# Patient Record
Sex: MALE | Race: BLACK OR AFRICAN AMERICAN | NOT HISPANIC OR LATINO | Employment: OTHER | RURAL
[De-identification: names, ages, dates, MRNs, and addresses within clinical notes are randomized per-mention and may not be internally consistent; named-entity substitution may affect disease eponyms.]

---

## 2020-11-10 ENCOUNTER — HISTORICAL (OUTPATIENT)
Dept: ADMINISTRATIVE | Facility: HOSPITAL | Age: 60
End: 2020-11-10

## 2021-05-07 ENCOUNTER — OFFICE VISIT (OUTPATIENT)
Dept: FAMILY MEDICINE | Facility: CLINIC | Age: 61
End: 2021-05-07
Payer: COMMERCIAL

## 2021-05-07 VITALS
DIASTOLIC BLOOD PRESSURE: 68 MMHG | HEART RATE: 71 BPM | TEMPERATURE: 99 F | SYSTOLIC BLOOD PRESSURE: 130 MMHG | HEIGHT: 74 IN | BODY MASS INDEX: 20.66 KG/M2 | OXYGEN SATURATION: 97 % | WEIGHT: 161 LBS

## 2021-05-07 DIAGNOSIS — L72.3 SEBACEOUS CYST: Primary | ICD-10-CM

## 2021-05-07 PROCEDURE — 99213 PR OFFICE/OUTPT VISIT, EST, LEVL III, 20-29 MIN: ICD-10-PCS | Mod: ,,, | Performed by: FAMILY MEDICINE

## 2021-05-07 PROCEDURE — 99213 OFFICE O/P EST LOW 20 MIN: CPT | Mod: ,,, | Performed by: FAMILY MEDICINE

## 2021-05-07 RX ORDER — FLUTICASONE PROPIONATE 50 MCG
SPRAY, SUSPENSION (ML) NASAL
COMMUNITY
End: 2021-05-13 | Stop reason: SDUPTHER

## 2021-05-07 RX ORDER — HYDROCHLOROTHIAZIDE 25 MG/1
1 TABLET ORAL DAILY
COMMUNITY
End: 2021-05-13 | Stop reason: SDUPTHER

## 2021-05-07 RX ORDER — DULOXETIN HYDROCHLORIDE 30 MG/1
30 CAPSULE, DELAYED RELEASE ORAL DAILY
COMMUNITY
End: 2021-05-13 | Stop reason: SDUPTHER

## 2021-05-07 RX ORDER — OLOPATADINE HYDROCHLORIDE 1 MG/ML
SOLUTION/ DROPS OPHTHALMIC
COMMUNITY
End: 2021-05-13 | Stop reason: SDUPTHER

## 2021-05-07 RX ORDER — ESCITALOPRAM OXALATE 10 MG/1
10 TABLET ORAL DAILY
COMMUNITY
End: 2021-05-13 | Stop reason: SDUPTHER

## 2021-05-07 RX ORDER — IPRATROPIUM BROMIDE AND ALBUTEROL SULFATE 2.5; .5 MG/3ML; MG/3ML
SOLUTION RESPIRATORY (INHALATION)
COMMUNITY
End: 2021-05-13 | Stop reason: SDUPTHER

## 2021-05-07 RX ORDER — MELOXICAM 7.5 MG/1
7.5 TABLET ORAL DAILY
COMMUNITY
End: 2021-05-13 | Stop reason: SDUPTHER

## 2021-05-07 RX ORDER — OMEPRAZOLE 20 MG/1
1 CAPSULE, DELAYED RELEASE ORAL DAILY
COMMUNITY
End: 2021-05-13 | Stop reason: SDUPTHER

## 2021-05-07 RX ORDER — ATORVASTATIN CALCIUM 10 MG/1
10 TABLET, FILM COATED ORAL DAILY
COMMUNITY
End: 2021-05-13 | Stop reason: SDUPTHER

## 2021-05-07 RX ORDER — BUSPIRONE HYDROCHLORIDE 10 MG/1
10 TABLET ORAL DAILY
COMMUNITY
Start: 2021-04-22 | End: 2021-05-13 | Stop reason: SDUPTHER

## 2021-05-07 RX ORDER — ALBUTEROL SULFATE 90 UG/1
AEROSOL, METERED RESPIRATORY (INHALATION)
COMMUNITY
End: 2021-05-13 | Stop reason: SDUPTHER

## 2021-05-07 RX ORDER — IBUPROFEN 800 MG/1
TABLET ORAL
COMMUNITY
End: 2021-05-13 | Stop reason: SDUPTHER

## 2021-05-13 RX ORDER — MELOXICAM 7.5 MG/1
7.5 TABLET ORAL DAILY
Qty: 30 TABLET | Refills: 0 | Status: SHIPPED | OUTPATIENT
Start: 2021-05-13 | End: 2021-08-05 | Stop reason: SDUPTHER

## 2021-05-13 RX ORDER — OMEPRAZOLE 20 MG/1
20 CAPSULE, DELAYED RELEASE ORAL DAILY
Qty: 90 CAPSULE | Refills: 0 | Status: SHIPPED | OUTPATIENT
Start: 2021-05-13 | End: 2021-09-10 | Stop reason: SDUPTHER

## 2021-05-13 RX ORDER — BUSPIRONE HYDROCHLORIDE 10 MG/1
10 TABLET ORAL DAILY
Qty: 90 TABLET | Refills: 0 | Status: SHIPPED | OUTPATIENT
Start: 2021-05-13 | End: 2021-12-07

## 2021-05-13 RX ORDER — ESCITALOPRAM OXALATE 10 MG/1
10 TABLET ORAL DAILY
Qty: 90 TABLET | Refills: 0 | Status: SHIPPED | OUTPATIENT
Start: 2021-05-13 | End: 2021-12-07

## 2021-05-13 RX ORDER — OLOPATADINE HYDROCHLORIDE 1 MG/ML
SOLUTION/ DROPS OPHTHALMIC
Qty: 5 ML | Refills: 2 | Status: SHIPPED | OUTPATIENT
Start: 2021-05-13 | End: 2021-12-07

## 2021-05-13 RX ORDER — FLUTICASONE PROPIONATE 50 MCG
SPRAY, SUSPENSION (ML) NASAL
Qty: 16 G | Refills: 2 | Status: SHIPPED | OUTPATIENT
Start: 2021-05-13 | End: 2022-09-16 | Stop reason: SDUPTHER

## 2021-05-13 RX ORDER — IBUPROFEN 800 MG/1
TABLET ORAL
Qty: 30 TABLET | Refills: 0 | Status: SHIPPED | OUTPATIENT
Start: 2021-05-13 | End: 2021-12-07

## 2021-05-13 RX ORDER — DULOXETIN HYDROCHLORIDE 30 MG/1
30 CAPSULE, DELAYED RELEASE ORAL DAILY
Qty: 90 CAPSULE | Refills: 0 | Status: SHIPPED | OUTPATIENT
Start: 2021-05-13 | End: 2021-12-07

## 2021-05-13 RX ORDER — IPRATROPIUM BROMIDE AND ALBUTEROL SULFATE 2.5; .5 MG/3ML; MG/3ML
SOLUTION RESPIRATORY (INHALATION)
Qty: 1 BOX | Refills: 1 | Status: SHIPPED | OUTPATIENT
Start: 2021-05-13 | End: 2021-09-10 | Stop reason: SDUPTHER

## 2021-05-13 RX ORDER — ALBUTEROL SULFATE 90 UG/1
AEROSOL, METERED RESPIRATORY (INHALATION)
Qty: 18 G | Refills: 1 | Status: SHIPPED | OUTPATIENT
Start: 2021-05-13 | End: 2021-09-10 | Stop reason: SDUPTHER

## 2021-05-13 RX ORDER — HYDROCHLOROTHIAZIDE 25 MG/1
25 TABLET ORAL DAILY
Qty: 90 TABLET | Refills: 0 | Status: SHIPPED | OUTPATIENT
Start: 2021-05-13 | End: 2021-09-10 | Stop reason: SDUPTHER

## 2021-05-13 RX ORDER — ATORVASTATIN CALCIUM 10 MG/1
10 TABLET, FILM COATED ORAL DAILY
Qty: 90 TABLET | Refills: 0 | Status: SHIPPED | OUTPATIENT
Start: 2021-05-13 | End: 2021-10-11 | Stop reason: SDUPTHER

## 2021-05-19 ENCOUNTER — OFFICE VISIT (OUTPATIENT)
Dept: SURGERY | Facility: CLINIC | Age: 61
End: 2021-05-19
Payer: COMMERCIAL

## 2021-05-19 DIAGNOSIS — L72.3 SEBACEOUS CYST: ICD-10-CM

## 2021-05-19 PROCEDURE — 99202 PR OFFICE/OUTPT VISIT, NEW, LEVL II, 15-29 MIN: ICD-10-PCS | Mod: S$PBB,,, | Performed by: SURGERY

## 2021-05-19 PROCEDURE — 99202 OFFICE O/P NEW SF 15 MIN: CPT | Mod: S$PBB,,, | Performed by: SURGERY

## 2021-05-19 PROCEDURE — 99214 OFFICE O/P EST MOD 30 MIN: CPT | Mod: PBBFAC | Performed by: SURGERY

## 2021-05-21 PROBLEM — L72.3 SEBACEOUS CYST: Status: ACTIVE | Noted: 2021-05-21

## 2021-05-27 RX ORDER — GABAPENTIN 300 MG/1
300 CAPSULE ORAL EVERY 8 HOURS
Qty: 270 CAPSULE | Refills: 0 | Status: SHIPPED | OUTPATIENT
Start: 2021-05-27 | End: 2021-12-07 | Stop reason: SDUPTHER

## 2021-05-27 RX ORDER — GABAPENTIN 300 MG/1
300 CAPSULE ORAL EVERY 8 HOURS
COMMUNITY
Start: 2021-01-12 | End: 2021-05-27 | Stop reason: SDUPTHER

## 2021-06-15 ENCOUNTER — OFFICE VISIT (OUTPATIENT)
Dept: SURGERY | Facility: CLINIC | Age: 61
End: 2021-06-15
Attending: SURGERY
Payer: COMMERCIAL

## 2021-06-15 DIAGNOSIS — L72.3 SEBACEOUS CYST: Primary | ICD-10-CM

## 2021-06-15 PROCEDURE — 10061 EXCISION OF CYST: ICD-10-PCS | Mod: S$PBB,,, | Performed by: SURGERY

## 2021-06-15 PROCEDURE — 10060 I&D ABSCESS SIMPLE/SINGLE: CPT | Mod: PBBFAC | Performed by: SURGERY

## 2021-06-15 PROCEDURE — 99213 OFFICE O/P EST LOW 20 MIN: CPT | Mod: PBBFAC,25 | Performed by: SURGERY

## 2021-06-15 PROCEDURE — 10061 I&D ABSCESS COMP/MULTIPLE: CPT | Mod: S$PBB,,, | Performed by: SURGERY

## 2021-06-24 ENCOUNTER — TELEPHONE (OUTPATIENT)
Dept: FAMILY MEDICINE | Facility: CLINIC | Age: 61
End: 2021-06-24

## 2021-06-25 ENCOUNTER — OFFICE VISIT (OUTPATIENT)
Dept: SURGERY | Facility: CLINIC | Age: 61
End: 2021-06-25
Attending: SURGERY
Payer: COMMERCIAL

## 2021-06-25 DIAGNOSIS — L72.3 SEBACEOUS CYST: Primary | ICD-10-CM

## 2021-06-25 PROCEDURE — 99024 PR POST-OP FOLLOW-UP VISIT: ICD-10-PCS | Mod: ,,, | Performed by: SURGERY

## 2021-06-25 PROCEDURE — 99024 POSTOP FOLLOW-UP VISIT: CPT | Mod: ,,, | Performed by: SURGERY

## 2021-06-25 PROCEDURE — 99213 OFFICE O/P EST LOW 20 MIN: CPT | Mod: PBBFAC | Performed by: SURGERY

## 2021-06-28 PROBLEM — Z09 POSTOP CHECK: Status: ACTIVE | Noted: 2021-06-28

## 2021-06-29 ENCOUNTER — OFFICE VISIT (OUTPATIENT)
Dept: FAMILY MEDICINE | Facility: CLINIC | Age: 61
End: 2021-06-29
Payer: COMMERCIAL

## 2021-06-29 VITALS
BODY MASS INDEX: 21.74 KG/M2 | HEIGHT: 73 IN | TEMPERATURE: 99 F | HEART RATE: 84 BPM | OXYGEN SATURATION: 96 % | SYSTOLIC BLOOD PRESSURE: 124 MMHG | WEIGHT: 164 LBS | DIASTOLIC BLOOD PRESSURE: 70 MMHG

## 2021-06-29 DIAGNOSIS — I10 ESSENTIAL HYPERTENSION: ICD-10-CM

## 2021-06-29 DIAGNOSIS — Z86.73 HISTORY OF CVA (CEREBROVASCULAR ACCIDENT) WITHOUT RESIDUAL DEFICITS: Primary | ICD-10-CM

## 2021-06-29 DIAGNOSIS — M54.16 LUMBAR RADICULOPATHY: ICD-10-CM

## 2021-06-29 DIAGNOSIS — J44.9 CHRONIC OBSTRUCTIVE PULMONARY DISEASE, UNSPECIFIED COPD TYPE: ICD-10-CM

## 2021-06-29 PROCEDURE — 99214 OFFICE O/P EST MOD 30 MIN: CPT | Mod: ,,, | Performed by: FAMILY MEDICINE

## 2021-06-29 PROCEDURE — 99214 PR OFFICE/OUTPT VISIT, EST, LEVL IV, 30-39 MIN: ICD-10-PCS | Mod: ,,, | Performed by: FAMILY MEDICINE

## 2021-06-29 RX ORDER — FLUOXETINE HCL 20 MG
20 CAPSULE ORAL DAILY
COMMUNITY
Start: 2021-04-29 | End: 2021-12-07

## 2021-06-29 RX ORDER — HYDROXYZINE PAMOATE 25 MG/1
25 CAPSULE ORAL 2 TIMES DAILY PRN
COMMUNITY
Start: 2021-04-29 | End: 2022-09-16 | Stop reason: SDUPTHER

## 2021-06-29 RX ORDER — QUETIAPINE FUMARATE 50 MG/1
50 TABLET, FILM COATED ORAL NIGHTLY
COMMUNITY
Start: 2021-04-29 | End: 2022-12-02 | Stop reason: SDUPTHER

## 2021-07-20 DIAGNOSIS — I63.9 CVA (CEREBRAL VASCULAR ACCIDENT): Primary | ICD-10-CM

## 2021-07-26 ENCOUNTER — CLINICAL SUPPORT (OUTPATIENT)
Dept: REHABILITATION | Facility: HOSPITAL | Age: 61
End: 2021-07-26
Payer: COMMERCIAL

## 2021-07-26 DIAGNOSIS — I63.9 CVA (CEREBRAL VASCULAR ACCIDENT): ICD-10-CM

## 2021-07-26 DIAGNOSIS — Z86.73 HISTORY OF CVA (CEREBROVASCULAR ACCIDENT) WITHOUT RESIDUAL DEFICITS: Primary | ICD-10-CM

## 2021-07-26 PROCEDURE — 97542 WHEELCHAIR MNGMENT TRAINING: CPT

## 2021-08-05 RX ORDER — MELOXICAM 7.5 MG/1
7.5 TABLET ORAL DAILY
Qty: 30 TABLET | Refills: 0 | Status: SHIPPED | OUTPATIENT
Start: 2021-08-05 | End: 2021-10-11 | Stop reason: SDUPTHER

## 2021-09-10 ENCOUNTER — OFFICE VISIT (OUTPATIENT)
Dept: FAMILY MEDICINE | Facility: CLINIC | Age: 61
End: 2021-09-10
Payer: COMMERCIAL

## 2021-09-10 VITALS
BODY MASS INDEX: 21.47 KG/M2 | SYSTOLIC BLOOD PRESSURE: 148 MMHG | TEMPERATURE: 98 F | DIASTOLIC BLOOD PRESSURE: 74 MMHG | HEIGHT: 73 IN | OXYGEN SATURATION: 98 % | WEIGHT: 162 LBS | HEART RATE: 67 BPM

## 2021-09-10 DIAGNOSIS — M54.16 LUMBAR RADICULOPATHY: Primary | ICD-10-CM

## 2021-09-10 DIAGNOSIS — I10 ESSENTIAL HYPERTENSION: ICD-10-CM

## 2021-09-10 DIAGNOSIS — J44.9 CHRONIC OBSTRUCTIVE PULMONARY DISEASE, UNSPECIFIED COPD TYPE: ICD-10-CM

## 2021-09-10 DIAGNOSIS — I69.30 HISTORY OF CVA WITH RESIDUAL DEFICIT: ICD-10-CM

## 2021-09-10 LAB
ANION GAP SERPL CALCULATED.3IONS-SCNC: 8 MMOL/L (ref 7–16)
BASOPHILS # BLD AUTO: 0.04 K/UL (ref 0–0.2)
BASOPHILS NFR BLD AUTO: 0.6 % (ref 0–1)
BUN SERPL-MCNC: 14 MG/DL (ref 7–18)
BUN/CREAT SERPL: 14 (ref 6–20)
CALCIUM SERPL-MCNC: 9 MG/DL (ref 8.5–10.1)
CHLORIDE SERPL-SCNC: 106 MMOL/L (ref 98–107)
CHOLEST SERPL-MCNC: 118 MG/DL (ref 0–200)
CHOLEST/HDLC SERPL: 1.7 {RATIO}
CO2 SERPL-SCNC: 30 MMOL/L (ref 21–32)
CREAT SERPL-MCNC: 1 MG/DL (ref 0.7–1.3)
DIFFERENTIAL METHOD BLD: ABNORMAL
EOSINOPHIL # BLD AUTO: 0.22 K/UL (ref 0–0.5)
EOSINOPHIL NFR BLD AUTO: 3.3 % (ref 1–4)
ERYTHROCYTE [DISTWIDTH] IN BLOOD BY AUTOMATED COUNT: 13.3 % (ref 11.5–14.5)
GLUCOSE SERPL-MCNC: 74 MG/DL (ref 74–106)
HCT VFR BLD AUTO: 37.5 % (ref 40–54)
HDLC SERPL-MCNC: 70 MG/DL (ref 40–60)
HGB BLD-MCNC: 12.7 G/DL (ref 13.5–18)
IMM GRANULOCYTES # BLD AUTO: 0.02 K/UL (ref 0–0.04)
IMM GRANULOCYTES NFR BLD: 0.3 % (ref 0–0.4)
LDLC SERPL CALC-MCNC: 37 MG/DL
LDLC/HDLC SERPL: 0.5 {RATIO}
LYMPHOCYTES # BLD AUTO: 2.51 K/UL (ref 1–4.8)
LYMPHOCYTES NFR BLD AUTO: 37.9 % (ref 27–41)
MCH RBC QN AUTO: 30.7 PG (ref 27–31)
MCHC RBC AUTO-ENTMCNC: 33.9 G/DL (ref 32–36)
MCV RBC AUTO: 90.6 FL (ref 80–96)
MONOCYTES # BLD AUTO: 0.49 K/UL (ref 0–0.8)
MONOCYTES NFR BLD AUTO: 7.4 % (ref 2–6)
MPC BLD CALC-MCNC: 11 FL (ref 9.4–12.4)
NEUTROPHILS # BLD AUTO: 3.35 K/UL (ref 1.8–7.7)
NEUTROPHILS NFR BLD AUTO: 50.5 % (ref 53–65)
NONHDLC SERPL-MCNC: 48 MG/DL
NRBC # BLD AUTO: 0 X10E3/UL
NRBC, AUTO (.00): 0 %
PLATELET # BLD AUTO: 205 K/UL (ref 150–400)
POTASSIUM SERPL-SCNC: 4.4 MMOL/L (ref 3.5–5.1)
RBC # BLD AUTO: 4.14 M/UL (ref 4.6–6.2)
SODIUM SERPL-SCNC: 140 MMOL/L (ref 136–145)
TRIGL SERPL-MCNC: 55 MG/DL (ref 35–150)
VLDLC SERPL-MCNC: 11 MG/DL
WBC # BLD AUTO: 6.63 K/UL (ref 4.5–11)

## 2021-09-10 PROCEDURE — 99214 PR OFFICE/OUTPT VISIT, EST, LEVL IV, 30-39 MIN: ICD-10-PCS | Mod: ,,, | Performed by: FAMILY MEDICINE

## 2021-09-10 PROCEDURE — 85025 COMPLETE CBC W/AUTO DIFF WBC: CPT | Mod: QW,,, | Performed by: CLINICAL MEDICAL LABORATORY

## 2021-09-10 PROCEDURE — 80061 LIPID PANEL: CPT | Mod: QW,,, | Performed by: CLINICAL MEDICAL LABORATORY

## 2021-09-10 PROCEDURE — 99214 OFFICE O/P EST MOD 30 MIN: CPT | Mod: ,,, | Performed by: FAMILY MEDICINE

## 2021-09-10 PROCEDURE — 80048 BASIC METABOLIC PNL TOTAL CA: CPT | Mod: QW,,, | Performed by: CLINICAL MEDICAL LABORATORY

## 2021-09-10 PROCEDURE — 85025 CBC WITH DIFFERENTIAL: ICD-10-PCS | Mod: QW,,, | Performed by: CLINICAL MEDICAL LABORATORY

## 2021-09-10 PROCEDURE — 80061 LIPID PANEL: ICD-10-PCS | Mod: QW,,, | Performed by: CLINICAL MEDICAL LABORATORY

## 2021-09-10 PROCEDURE — 80048 BASIC METABOLIC PANEL: ICD-10-PCS | Mod: QW,,, | Performed by: CLINICAL MEDICAL LABORATORY

## 2021-09-10 RX ORDER — OMEPRAZOLE 20 MG/1
20 CAPSULE, DELAYED RELEASE ORAL DAILY
Qty: 90 CAPSULE | Refills: 0 | Status: SHIPPED | OUTPATIENT
Start: 2021-09-10 | End: 2021-12-02 | Stop reason: SDUPTHER

## 2021-09-10 RX ORDER — TRAMADOL HYDROCHLORIDE 50 MG/1
50 TABLET ORAL EVERY 6 HOURS PRN
Qty: 20 TABLET | Refills: 0 | Status: SHIPPED | OUTPATIENT
Start: 2021-09-10 | End: 2021-12-07

## 2021-09-10 RX ORDER — IPRATROPIUM BROMIDE AND ALBUTEROL SULFATE 2.5; .5 MG/3ML; MG/3ML
SOLUTION RESPIRATORY (INHALATION)
Qty: 1 BOX | Refills: 1 | Status: SHIPPED | OUTPATIENT
Start: 2021-09-10 | End: 2021-12-07

## 2021-09-10 RX ORDER — ALBUTEROL SULFATE 90 UG/1
AEROSOL, METERED RESPIRATORY (INHALATION)
Qty: 18 G | Refills: 1 | Status: SHIPPED | OUTPATIENT
Start: 2021-09-10 | End: 2021-12-07

## 2021-09-10 RX ORDER — HYDROCHLOROTHIAZIDE 25 MG/1
25 TABLET ORAL DAILY
Qty: 90 TABLET | Refills: 0 | Status: SHIPPED | OUTPATIENT
Start: 2021-09-10 | End: 2021-12-07 | Stop reason: SDUPTHER

## 2021-09-13 ENCOUNTER — TELEPHONE (OUTPATIENT)
Dept: FAMILY MEDICINE | Facility: CLINIC | Age: 61
End: 2021-09-13

## 2021-09-16 ENCOUNTER — TELEPHONE (OUTPATIENT)
Dept: FAMILY MEDICINE | Facility: CLINIC | Age: 61
End: 2021-09-16

## 2021-09-27 PROBLEM — Z09 POSTOP CHECK: Status: RESOLVED | Noted: 2021-06-28 | Resolved: 2021-09-27

## 2021-10-11 ENCOUNTER — OFFICE VISIT (OUTPATIENT)
Dept: FAMILY MEDICINE | Facility: CLINIC | Age: 61
End: 2021-10-11
Payer: COMMERCIAL

## 2021-10-11 VITALS
HEIGHT: 73 IN | DIASTOLIC BLOOD PRESSURE: 60 MMHG | TEMPERATURE: 97 F | OXYGEN SATURATION: 98 % | WEIGHT: 169 LBS | SYSTOLIC BLOOD PRESSURE: 130 MMHG | BODY MASS INDEX: 22.4 KG/M2 | HEART RATE: 68 BPM

## 2021-10-11 DIAGNOSIS — J98.59 MEDIASTINAL MASS: ICD-10-CM

## 2021-10-11 DIAGNOSIS — R91.8 LUNG MASS: Primary | ICD-10-CM

## 2021-10-11 PROCEDURE — 99213 OFFICE O/P EST LOW 20 MIN: CPT | Mod: ,,, | Performed by: FAMILY MEDICINE

## 2021-10-11 PROCEDURE — 99213 PR OFFICE/OUTPT VISIT, EST, LEVL III, 20-29 MIN: ICD-10-PCS | Mod: ,,, | Performed by: FAMILY MEDICINE

## 2021-10-11 RX ORDER — MELOXICAM 7.5 MG/1
7.5 TABLET ORAL DAILY
Qty: 30 TABLET | Refills: 0 | Status: SHIPPED | OUTPATIENT
Start: 2021-10-11 | End: 2021-12-02 | Stop reason: SDUPTHER

## 2021-10-11 RX ORDER — ATORVASTATIN CALCIUM 10 MG/1
10 TABLET, FILM COATED ORAL DAILY
Qty: 90 TABLET | Refills: 0 | Status: SHIPPED | OUTPATIENT
Start: 2021-10-11 | End: 2021-12-07

## 2021-10-18 ENCOUNTER — TELEPHONE (OUTPATIENT)
Dept: FAMILY MEDICINE | Facility: CLINIC | Age: 61
End: 2021-10-18
Payer: COMMERCIAL

## 2021-11-23 ENCOUNTER — TELEPHONE (OUTPATIENT)
Dept: FAMILY MEDICINE | Facility: CLINIC | Age: 61
End: 2021-11-23
Payer: COMMERCIAL

## 2021-11-29 RX ORDER — ROSUVASTATIN CALCIUM 20 MG/1
1 TABLET, COATED ORAL DAILY
COMMUNITY
Start: 2021-11-22 | End: 2021-12-07 | Stop reason: SDUPTHER

## 2021-11-29 RX ORDER — DOCUSATE SODIUM 100 MG/1
100 CAPSULE, LIQUID FILLED ORAL 2 TIMES DAILY
COMMUNITY
End: 2022-04-15 | Stop reason: SDUPTHER

## 2021-11-29 RX ORDER — ASPIRIN 81 MG/1
81 TABLET ORAL DAILY
COMMUNITY
End: 2022-09-16 | Stop reason: SDUPTHER

## 2021-11-29 RX ORDER — APIXABAN 5 MG/1
1 TABLET, FILM COATED ORAL 2 TIMES DAILY
COMMUNITY
Start: 2021-11-22 | End: 2022-03-18 | Stop reason: SDUPTHER

## 2021-12-02 RX ORDER — MELOXICAM 7.5 MG/1
7.5 TABLET ORAL DAILY
Qty: 30 TABLET | Refills: 0 | Status: SHIPPED | OUTPATIENT
Start: 2021-12-02 | End: 2021-12-07 | Stop reason: SDUPTHER

## 2021-12-02 RX ORDER — OMEPRAZOLE 20 MG/1
20 CAPSULE, DELAYED RELEASE ORAL DAILY
Qty: 90 CAPSULE | Refills: 0 | Status: SHIPPED | OUTPATIENT
Start: 2021-12-02 | End: 2021-12-07 | Stop reason: SDUPTHER

## 2021-12-07 ENCOUNTER — OFFICE VISIT (OUTPATIENT)
Dept: FAMILY MEDICINE | Facility: CLINIC | Age: 61
End: 2021-12-07
Payer: COMMERCIAL

## 2021-12-07 VITALS
HEART RATE: 88 BPM | WEIGHT: 165 LBS | OXYGEN SATURATION: 97 % | DIASTOLIC BLOOD PRESSURE: 60 MMHG | BODY MASS INDEX: 23.1 KG/M2 | TEMPERATURE: 97 F | SYSTOLIC BLOOD PRESSURE: 132 MMHG | HEIGHT: 71 IN

## 2021-12-07 DIAGNOSIS — C34.31 MALIGNANT NEOPLASM OF LOWER LOBE, RIGHT BRONCHUS OR LUNG: Primary | ICD-10-CM

## 2021-12-07 DIAGNOSIS — I26.99 THROMBUS OF PULMONARY VEIN: ICD-10-CM

## 2021-12-07 DIAGNOSIS — G89.3 CANCER ASSOCIATED PAIN: ICD-10-CM

## 2021-12-07 PROBLEM — F17.210 CIGARETTE SMOKER: Status: ACTIVE | Noted: 2021-12-07

## 2021-12-07 PROCEDURE — 99495 TCM SERVICES (MODERATE COMPLEXITY): ICD-10-PCS | Mod: ,,, | Performed by: FAMILY MEDICINE

## 2021-12-07 PROCEDURE — 99495 TRANSJ CARE MGMT MOD F2F 14D: CPT | Mod: ,,, | Performed by: FAMILY MEDICINE

## 2021-12-07 RX ORDER — MELOXICAM 7.5 MG/1
7.5 TABLET ORAL DAILY
Qty: 30 TABLET | Refills: 0 | Status: SHIPPED | OUTPATIENT
Start: 2021-12-07 | End: 2022-09-16 | Stop reason: SDUPTHER

## 2021-12-07 RX ORDER — ATORVASTATIN CALCIUM 10 MG/1
1 TABLET, FILM COATED ORAL DAILY
COMMUNITY
End: 2021-12-07

## 2021-12-07 RX ORDER — LEVOFLOXACIN 500 MG/1
1 TABLET, FILM COATED ORAL DAILY
COMMUNITY
Start: 2021-11-09 | End: 2022-09-16 | Stop reason: ALTCHOICE

## 2021-12-07 RX ORDER — OMEPRAZOLE 20 MG/1
20 CAPSULE, DELAYED RELEASE ORAL DAILY
Qty: 90 CAPSULE | Refills: 0 | Status: SHIPPED | OUTPATIENT
Start: 2021-12-07 | End: 2022-01-18 | Stop reason: SDUPTHER

## 2021-12-07 RX ORDER — HYDROCODONE BITARTRATE AND ACETAMINOPHEN 7.5; 325 MG/1; MG/1
1 TABLET ORAL EVERY 6 HOURS PRN
COMMUNITY
Start: 2021-12-01 | End: 2021-12-07 | Stop reason: SDUPTHER

## 2021-12-07 RX ORDER — HYDROCODONE BITARTRATE AND ACETAMINOPHEN 7.5; 325 MG/1; MG/1
1 TABLET ORAL EVERY 6 HOURS PRN
Qty: 20 TABLET | Refills: 0 | Status: SHIPPED | OUTPATIENT
Start: 2021-12-07 | End: 2022-03-23

## 2021-12-07 RX ORDER — GABAPENTIN 300 MG/1
300 CAPSULE ORAL EVERY 8 HOURS
Qty: 270 CAPSULE | Refills: 0 | Status: SHIPPED | OUTPATIENT
Start: 2021-12-07 | End: 2022-09-16 | Stop reason: SDUPTHER

## 2021-12-07 RX ORDER — HYDROCHLOROTHIAZIDE 25 MG/1
25 TABLET ORAL DAILY
Qty: 90 TABLET | Refills: 0 | Status: SHIPPED | OUTPATIENT
Start: 2021-12-07 | End: 2022-01-18 | Stop reason: SDUPTHER

## 2021-12-07 RX ORDER — BENZONATATE 100 MG/1
CAPSULE ORAL
Qty: 30 CAPSULE | Refills: 0 | Status: SHIPPED | OUTPATIENT
Start: 2021-12-07 | End: 2022-03-18 | Stop reason: SDUPTHER

## 2021-12-07 RX ORDER — ROSUVASTATIN CALCIUM 20 MG/1
20 TABLET, COATED ORAL DAILY
Qty: 90 TABLET | Refills: 0 | Status: SHIPPED | OUTPATIENT
Start: 2021-12-07 | End: 2022-01-18 | Stop reason: SDUPTHER

## 2022-01-05 ENCOUNTER — PATIENT OUTREACH (OUTPATIENT)
Dept: FAMILY MEDICINE | Facility: CLINIC | Age: 62
End: 2022-01-05
Payer: COMMERCIAL

## 2022-01-18 ENCOUNTER — OFFICE VISIT (OUTPATIENT)
Dept: FAMILY MEDICINE | Facility: CLINIC | Age: 62
End: 2022-01-18
Payer: COMMERCIAL

## 2022-01-18 VITALS
SYSTOLIC BLOOD PRESSURE: 120 MMHG | TEMPERATURE: 97 F | DIASTOLIC BLOOD PRESSURE: 62 MMHG | BODY MASS INDEX: 21.87 KG/M2 | OXYGEN SATURATION: 97 % | HEART RATE: 96 BPM | HEIGHT: 73 IN | WEIGHT: 165 LBS

## 2022-01-18 DIAGNOSIS — G89.3 CANCER RELATED PAIN: ICD-10-CM

## 2022-01-18 DIAGNOSIS — C34.31 MALIGNANT NEOPLASM OF LOWER LOBE, RIGHT BRONCHUS OR LUNG: Primary | ICD-10-CM

## 2022-01-18 PROCEDURE — 1159F MED LIST DOCD IN RCRD: CPT | Mod: ,,, | Performed by: FAMILY MEDICINE

## 2022-01-18 PROCEDURE — 3074F PR MOST RECENT SYSTOLIC BLOOD PRESSURE < 130 MM HG: ICD-10-PCS | Mod: ,,, | Performed by: FAMILY MEDICINE

## 2022-01-18 PROCEDURE — 99213 OFFICE O/P EST LOW 20 MIN: CPT | Mod: ,,, | Performed by: FAMILY MEDICINE

## 2022-01-18 PROCEDURE — 3078F PR MOST RECENT DIASTOLIC BLOOD PRESSURE < 80 MM HG: ICD-10-PCS | Mod: ,,, | Performed by: FAMILY MEDICINE

## 2022-01-18 PROCEDURE — 3074F SYST BP LT 130 MM HG: CPT | Mod: ,,, | Performed by: FAMILY MEDICINE

## 2022-01-18 PROCEDURE — 3008F BODY MASS INDEX DOCD: CPT | Mod: ,,, | Performed by: FAMILY MEDICINE

## 2022-01-18 PROCEDURE — 99213 PR OFFICE/OUTPT VISIT, EST, LEVL III, 20-29 MIN: ICD-10-PCS | Mod: ,,, | Performed by: FAMILY MEDICINE

## 2022-01-18 PROCEDURE — 3008F PR BODY MASS INDEX (BMI) DOCUMENTED: ICD-10-PCS | Mod: ,,, | Performed by: FAMILY MEDICINE

## 2022-01-18 PROCEDURE — 1159F PR MEDICATION LIST DOCUMENTED IN MEDICAL RECORD: ICD-10-PCS | Mod: ,,, | Performed by: FAMILY MEDICINE

## 2022-01-18 PROCEDURE — 1160F PR REVIEW ALL MEDS BY PRESCRIBER/CLIN PHARMACIST DOCUMENTED: ICD-10-PCS | Mod: ,,, | Performed by: FAMILY MEDICINE

## 2022-01-18 PROCEDURE — 1160F RVW MEDS BY RX/DR IN RCRD: CPT | Mod: ,,, | Performed by: FAMILY MEDICINE

## 2022-01-18 PROCEDURE — 3078F DIAST BP <80 MM HG: CPT | Mod: ,,, | Performed by: FAMILY MEDICINE

## 2022-01-18 RX ORDER — ROSUVASTATIN CALCIUM 20 MG/1
20 TABLET, COATED ORAL DAILY
Qty: 90 TABLET | Refills: 0 | Status: SHIPPED | OUTPATIENT
Start: 2022-01-18 | End: 2022-03-18 | Stop reason: SDUPTHER

## 2022-01-18 RX ORDER — HYDROCODONE BITARTRATE AND ACETAMINOPHEN 5; 325 MG/1; MG/1
1 TABLET ORAL EVERY 6 HOURS PRN
Qty: 20 TABLET | Refills: 0 | Status: SHIPPED | OUTPATIENT
Start: 2022-01-18 | End: 2022-03-23

## 2022-01-18 RX ORDER — HYDROCHLOROTHIAZIDE 25 MG/1
25 TABLET ORAL DAILY
Qty: 90 TABLET | Refills: 0 | Status: SHIPPED | OUTPATIENT
Start: 2022-01-18 | End: 2022-03-18 | Stop reason: SDUPTHER

## 2022-01-18 RX ORDER — OMEPRAZOLE 20 MG/1
20 CAPSULE, DELAYED RELEASE ORAL DAILY
Qty: 90 CAPSULE | Refills: 0 | Status: SHIPPED | OUTPATIENT
Start: 2022-01-18 | End: 2022-04-15 | Stop reason: SDUPTHER

## 2022-01-20 NOTE — PATIENT INSTRUCTIONS
- Take medications as prescribed  - Notify clinic if symptoms persist or worsen  - follow-up with subspecialists as scheduled

## 2022-01-20 NOTE — PROGRESS NOTES
[facility] Medicine    Chief Complaint      Chief Complaint   Patient presents with    Follow-up     6 week follow-up       History of Present Illness      Mayur Lundberg is a 61 y.o. male with chronic conditions of lung cancer, COPD, CVA with residual motor deficits, HTN and lumbar radiculopathy who presents today for routine follow up.  Patient states he is doing well overall.  Has follow-up with kathy Onc on 02/03/2022.  Currently undergoing radiation treatments.  States he is having cough with small amount of hemoptysis. States heme/onc aware and told it was to be expected with him on Eliquis.  Still having significant pain.  States Norco has been helping keep symptoms under control.      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    Social History:  Social History     Tobacco Use    Smoking status: Current Every Day Smoker     Packs/day: 1.00     Types: Cigarettes    Smokeless tobacco: Never Used   Substance Use Topics    Alcohol use: Yes     Alcohol/week: 2.0 standard drinks     Types: 2 Cans of beer per week    Drug use: Never       I personally reviewed all past medical, surgical, and social.     Review of Systems   Constitutional: Negative for fatigue and fever.   HENT: Negative for ear pain.    Eyes: Negative for pain and visual disturbance.   Respiratory: Positive for cough. Negative for chest tightness and shortness of breath.    Cardiovascular: Negative for chest pain and leg  swelling.   Gastrointestinal: Negative for abdominal pain.   Genitourinary: Negative for difficulty urinating.   Musculoskeletal: Positive for arthralgias and gait problem. Negative for myalgias.   Skin: Negative for rash.   Neurological: Negative for dizziness and light-headedness.   Hematological: Does not bruise/bleed easily.        Medications:  Outpatient Encounter Medications as of 1/18/2022   Medication Sig Dispense Refill    aspirin (ECOTRIN) 81 MG EC tablet Take 81 mg by mouth once daily.      benzonatate (TESSALON) 100 MG capsule One to two capsules three times daily as needed for cough 30 capsule 0    docusate sodium (COLACE) 100 MG capsule Take 100 mg by mouth 2 (two) times daily.      ELIQUIS 5 mg Tab Take 1 tablet by mouth 2 (two) times a day.      fluticasone propionate (FLONASE) 50 mcg/actuation nasal spray fluticasone propionate 50 mcg/actuation nasal spray,suspension 16 g 2    gabapentin (NEURONTIN) 300 MG capsule Take 1 capsule (300 mg total) by mouth every 8 (eight) hours. 270 capsule 0    HYDROcodone-acetaminophen (NORCO) 7.5-325 mg per tablet Take 1 tablet by mouth every 6 (six) hours as needed. 20 tablet 0    levoFLOXacin (LEVAQUIN) 500 MG tablet Take 1 tablet by mouth once daily.      meloxicam (MOBIC) 7.5 MG tablet Take 1 tablet (7.5 mg total) by mouth once daily. 30 tablet 0    SEROQUEL 50 mg tablet Take 50 mg by mouth nightly.      tiotropium-olodateroL (STIOLTO RESPIMAT) 2.5-2.5 mcg/actuation Mist Stiolto Respimat 2.5 mcg-2.5 mcg/actuation solution for inhalation 4 g 2    VISTARIL 25 mg Cap Take 25 mg by mouth 2 (two) times daily as needed.      [DISCONTINUED] hydroCHLOROthiazide (HYDRODIURIL) 25 MG tablet Take 1 tablet (25 mg total) by mouth once daily. 90 tablet 0    [DISCONTINUED] omeprazole (PRILOSEC) 20 MG capsule Take 1 capsule (20 mg total) by mouth once daily. 90 capsule 0    [DISCONTINUED] rosuvastatin (CRESTOR) 20 MG tablet Take 1 tablet (20 mg total) by mouth  "once daily. 90 tablet 0    hydroCHLOROthiazide (HYDRODIURIL) 25 MG tablet Take 1 tablet (25 mg total) by mouth once daily. 90 tablet 0    HYDROcodone-acetaminophen (NORCO) 5-325 mg per tablet Take 1 tablet by mouth every 6 (six) hours as needed for Pain. 20 tablet 0    omeprazole (PRILOSEC) 20 MG capsule Take 1 capsule (20 mg total) by mouth once daily. 90 capsule 0    rosuvastatin (CRESTOR) 20 MG tablet Take 1 tablet (20 mg total) by mouth once daily. 90 tablet 0     No facility-administered encounter medications on file as of 1/18/2022.       Allergies:  Review of patient's allergies indicates:  No Known Allergies    Health Maintenance:    There is no immunization history on file for this patient.   Health Maintenance   Topic Date Due    Hepatitis C Screening  Never done    TETANUS VACCINE  Never done    High Dose Statin  01/18/2023    Lipid Panel  09/10/2026        Physical Exam      Vital Signs  Temp: 97.4 °F (36.3 °C)  Pulse: 96  SpO2: 97 %  BP: 120/62  BP Location: Left arm  Patient Position: Sitting  Height and Weight  Height: 6' 1" (185.4 cm)  Weight: 74.8 kg (165 lb)  BSA (Calculated - sq m): 1.96 sq meters  BMI (Calculated): 21.8  Weight in (lb) to have BMI = 25: 189.1]    Physical Exam  Constitutional:       Appearance: Normal appearance.   HENT:      Head: Normocephalic.   Eyes:      Conjunctiva/sclera: Conjunctivae normal.      Pupils: Pupils are equal, round, and reactive to light.   Cardiovascular:      Rate and Rhythm: Normal rate and regular rhythm.      Pulses: Normal pulses.   Pulmonary:      Effort: Pulmonary effort is normal.      Breath sounds: Normal breath sounds.   Abdominal:      General: Bowel sounds are normal. There is no distension.      Palpations: Abdomen is soft.   Musculoskeletal:      Right lower leg: No edema.      Left lower leg: No edema.   Skin:     General: Skin is warm and dry.   Neurological:      Mental Status: He is alert and oriented to person, place, and time. " Mental status is at baseline.   Psychiatric:         Mood and Affect: Mood normal.          Laboratory:  CBC:  Recent Labs   Lab 09/10/21  1324   WBC 6.63   RBC 4.14 L   Hemoglobin 12.7 L   Hematocrit 37.5 L   Platelet Count 205   MCV 90.6   MCH 30.7   MCHC 33.9     CMP:  Recent Labs   Lab 09/10/21  1324   Glucose 74   Calcium 9.0   Sodium 140   Potassium 4.4   CO2 30   Chloride 106   BUN 14     LIPIDS:  Recent Labs   Lab 09/10/21  1324   HDL Cholesterol 70 H   Cholesterol 118   Triglycerides 55   LDL Calculated 37   Cholesterol/HDL Ratio (Risk Factor) 1.7   Non-HDL 48     TSH:      A1C:        Assessment/Plan     Mayur Lundberg is a 61 y.o.male with:     1. Malignant neoplasm of lower lobe, right bronchus or lung  - stable  - follow-up with Heme-Onc as scheduled  - Ambulatory referral/consult to Pain Clinic; Future    2. Cancer related pain  - will refill Norco 5-325mg one tab q6h PRN #20 no refills  ---  reviewed  - Ambulatory referral/consult to Pain Clinic; Future       Total time spent face-to-face and non-face-to-face coordinating care for this encounter was: 20 min    Chronic conditions status updated as per HPI.  Other than changes above, cont current medications and maintain follow up with specialists.  Return to clinic in 2 months.    Chris Bird DO  [facility]

## 2022-02-23 ENCOUNTER — OFFICE VISIT (OUTPATIENT)
Dept: PAIN MEDICINE | Facility: CLINIC | Age: 62
End: 2022-02-23
Payer: COMMERCIAL

## 2022-02-23 VITALS
WEIGHT: 159 LBS | HEART RATE: 99 BPM | SYSTOLIC BLOOD PRESSURE: 142 MMHG | BODY MASS INDEX: 20.41 KG/M2 | HEIGHT: 74 IN | DIASTOLIC BLOOD PRESSURE: 79 MMHG

## 2022-02-23 DIAGNOSIS — M54.16 LUMBAR RADICULOPATHY: Primary | ICD-10-CM

## 2022-02-23 DIAGNOSIS — M25.562 CHRONIC PAIN OF BOTH KNEES: ICD-10-CM

## 2022-02-23 DIAGNOSIS — C34.31 MALIGNANT NEOPLASM OF LOWER LOBE, RIGHT BRONCHUS OR LUNG: ICD-10-CM

## 2022-02-23 DIAGNOSIS — M25.561 CHRONIC PAIN OF BOTH KNEES: ICD-10-CM

## 2022-02-23 DIAGNOSIS — Z79.899 ENCOUNTER FOR LONG-TERM (CURRENT) USE OF OTHER MEDICATIONS: ICD-10-CM

## 2022-02-23 DIAGNOSIS — G89.3 CANCER RELATED PAIN: ICD-10-CM

## 2022-02-23 DIAGNOSIS — G89.29 CHRONIC PAIN OF BOTH KNEES: ICD-10-CM

## 2022-02-23 PROCEDURE — 3008F BODY MASS INDEX DOCD: CPT | Mod: CPTII,,, | Performed by: PAIN MEDICINE

## 2022-02-23 PROCEDURE — 3077F SYST BP >= 140 MM HG: CPT | Mod: CPTII,,, | Performed by: PAIN MEDICINE

## 2022-02-23 PROCEDURE — 3078F PR MOST RECENT DIASTOLIC BLOOD PRESSURE < 80 MM HG: ICD-10-PCS | Mod: CPTII,,, | Performed by: PAIN MEDICINE

## 2022-02-23 PROCEDURE — 99213 PR OFFICE/OUTPT VISIT, EST, LEVL III, 20-29 MIN: ICD-10-PCS | Mod: S$PBB,,, | Performed by: PAIN MEDICINE

## 2022-02-23 PROCEDURE — 99213 OFFICE O/P EST LOW 20 MIN: CPT | Mod: S$PBB,,, | Performed by: PAIN MEDICINE

## 2022-02-23 PROCEDURE — G0481 DRUG TEST DEF 8-14 CLASSES: HCPCS | Mod: 90 | Performed by: PAIN MEDICINE

## 2022-02-23 PROCEDURE — 99215 OFFICE O/P EST HI 40 MIN: CPT | Mod: PBBFAC | Performed by: PAIN MEDICINE

## 2022-02-23 PROCEDURE — 1159F MED LIST DOCD IN RCRD: CPT | Mod: CPTII,,, | Performed by: PAIN MEDICINE

## 2022-02-23 PROCEDURE — 3078F DIAST BP <80 MM HG: CPT | Mod: CPTII,,, | Performed by: PAIN MEDICINE

## 2022-02-23 PROCEDURE — 1159F PR MEDICATION LIST DOCUMENTED IN MEDICAL RECORD: ICD-10-PCS | Mod: CPTII,,, | Performed by: PAIN MEDICINE

## 2022-02-23 PROCEDURE — 3008F PR BODY MASS INDEX (BMI) DOCUMENTED: ICD-10-PCS | Mod: CPTII,,, | Performed by: PAIN MEDICINE

## 2022-02-23 PROCEDURE — 3077F PR MOST RECENT SYSTOLIC BLOOD PRESSURE >= 140 MM HG: ICD-10-PCS | Mod: CPTII,,, | Performed by: PAIN MEDICINE

## 2022-02-23 RX ORDER — HYDROCODONE BITARTRATE AND ACETAMINOPHEN 5; 325 MG/1; MG/1
1 TABLET ORAL EVERY 12 HOURS PRN
Qty: 60 TABLET | Refills: 0 | Status: SHIPPED | OUTPATIENT
Start: 2022-02-23 | End: 2022-03-23

## 2022-02-23 NOTE — PROGRESS NOTES
Pt is here in room 12 for new pt referral from Dr Cardenas for cancer.  Pt was diagnosed with lung cancer Oct 2021 and is currently getting radiation treatments.  Pt states Dr Cardenas informed him he could not prescribe his Norco any more and he is not sure why. Pt was last seen by  RADHA Mae 1- and Dr Masterson 11-.

## 2022-02-23 NOTE — PROGRESS NOTES
She Disclaimer: This note has been generated using voice-recognition software. There may be typographical errors that have been missed during proof-reading        Patient ID: Mayur Lundberg is a 61 y.o. male.      Chief Complaint: right side of chest pain      Patient is a 61-year-old male who was last evaluated in the Pain Clinic January 11, 2021 by ITA Mae.  He has a long history of  rheumatoid arthritis, status post CVA x2, chronic pain and anterior fusion is C4-6.  He was treated conservatively initially by Dr. Robin and  Laureen Patterson, WHIT,  but was discharged due to illicit substances.  He was re-evaluated by our  service and offered adjunctive medication management.  He deferred nerve block injections and was lost to follow-up.  Unfortunately, October 15, 2021 he was diagnosed with metastatic lung cancer.  He is status post radiation and awaiting repeat treatment.  He complains of severe right intercostal pain that is exacerbated with right lateral decubitus positioning and coughing.  He continues complain of bilateral knee pain and decreased ability to walk or stand for prolonged periods of time.  He was referred to our clinic for opioid maintenance.  Patient is adamant that he no longer participates in any type illicit substance behavior.  He realizes that opioids are necessary to control his cancer pain and agrees to the opioid contract for opioid maintenance.            Pain Assessment  Pain Score:   8  Pain Location: Rib cage  Pain Orientation: Right  Pain Descriptors: Aching, Burning, Constant, Dull, Sharp, Stabbing, Tingling  Pain Frequency: Continuous  Pain Onset: Awakened from sleep  Clinical Progression: Gradually worsening  Aggravating Factors: Other (Comment) (activity)  Pain Intervention(s): Medication (See eMAR), Rest      A's of Opioid Risk Assessment  Activity:Patient can not perform ADL.   Analgesia:Patients pain is not controlled by current medication.   Adverse Effects: Patient denies  constipation or sedation.  Aberrant Behavior:  reviewed with no aberrant drug seeking/taking behavior.      Patient denies any suicidal or homicidal ideations    Physical Therapy/Home Exercise: yes      MRI Cervical Spine W WO Cont  Narrative: MRI CERVICAL SPINE W/WO CONTRAST    Indication: Extremity weakness, previous surgery     Technique: Axial and sagittal imaging of the spine is performed using  T1, T2 , STIR and T2 fat sat sequences without contrast. Post contrast  the T1-weighted sequences are performed after administration of 20 cc  Dotarem.    Comparison: 28 January 2019    Findings: Vertebral bodies are similar in height and alignment.   Anterior fusion at C4-C6 appear similar. There is been posterior  laminectomy and posterior fusion performed since previous study, appears  within normal limits.    There is cord signal hyperintensity with mild loss at the C3-4 level  especially on the left side. The signal abnormality was present in the  spinal cord on the previous study at this level.    C3-4: There is disc osteophyte complex with mild canal stenosis. There  is uncovertebral joint hypertrophy with mild bilateral foraminal  stenosis.    C5-C6: There is uncovertebral joint hypertrophy contributing to moderate  right foraminal stenosis.    C6-C7: There is disc osteophyte complex with mild central canal  stenosis. There is uncovertebral joint hypertrophy with moderate to  severe right and moderate left foraminal stenosis.    C7-T1: There is disc osteophyte complex with mild central canal  stenosis. There is uncovertebral joint and facet joint hypertrophy with  severe bilateral foraminal stenosis.    Remaining spinal cord signal appears within normal limits.      Osseous marrow signal appears within normal limits.     No abnormal enhancement is seen on the post-contrasted images when  compared to the precontrast study.  Impression: Impression: Multilevel cervical fusion and spondylitic changes  as  described above. 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    This report has been electronically signed by Marlo Chaudhary  X-Ray Tibia Fibula 2 View Left  Narrative: CR SPINE CERVICAL AP AND LATERAL    Indication: Cervicalgia     Comparison: 15 Patsy 2014    Findings: No fracture is seen. Vertebral body heights and alignment are  stable with fusion from C3-C6 unchanged from previous.    There is loss of disc space and degenerative change moderate at C6-C7  and mild to moderate at C2-3.  Impression: Impression: Surgical changes and spondylitic changes. Stable when  compared to previous.    This report has been electronically signed by Marlo Chaudhary      Review of Systems   Constitutional: Negative.    HENT: Negative.    Eyes: Negative.    Respiratory: Negative.    Cardiovascular: Positive for chest pain (right chest wall).   Gastrointestinal: Negative.    Endocrine: Negative.    Genitourinary: Negative.    Musculoskeletal: Positive for arthralgias (knees) and gait problem.   Integumentary:  Negative.   Allergic/Immunologic: Negative.    Neurological: Positive for weakness (RLE) and numbness (RLE).   Hematological: Negative.    Psychiatric/Behavioral: Positive for sleep disturbance.             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 Renetta      ESOPHAGOGASTRODUODENOSCOPY      LEFT C3-C7 MBB Left  06/08/2011    DR MCFARLAND    LEFT L4-L5 TFESI Left     X6 [id number]  DR MCFARLAND    LUMBAR EPIDURAL INJECTION      TFESI L4-5      NECK SURGERY      RIGHT C3-C7 MBB Right 2015    X2  DR MCFARLAND    SOFT TISSUE CYST EXCISION       Social History     Socioeconomic History    Marital status:    Occupational History    Occupation: retired   Tobacco Use    Smoking status: Former Smoker     Packs/day: 1.00     Types: Cigarettes    Smokeless tobacco: Never Used   Substance and Sexual Activity    Alcohol use: Yes     Alcohol/week: 2.0 standard drinks     Types: 2 Cans of beer per week    Drug use: Never    Sexual activity: Not Currently     Family History   Problem Relation Age of Onset    Diabetes Mother     Cancer Mother     Heart disease Mother     Hypertension Mother     Diabetes Sister     Diabetes Brother     Diabetes Father     Hypertension Father     Stroke Father      Review of patient's allergies indicates:  No Known Allergies  has a current medication list which includes the following prescription(s): aspirin, benzonatate, docusate sodium, eliquis, fluticasone propionate, gabapentin, hydrochlorothiazide, hydrocodone-acetaminophen, omeprazole, rosuvastatin, seroquel, tiotropium-olodaterol, vistaril, hydrocodone-acetaminophen, hydrocodone-acetaminophen, levofloxacin, and meloxicam.      Objective:  Vitals:    02/23/22 1350   BP: (!) 142/79   Pulse: 99        Physical Exam  Vitals and nursing note reviewed.   Constitutional:       General: He is not in acute distress.     Appearance: Normal appearance. He is underweight. He is ill-appearing. He is not toxic-appearing or diaphoretic.   HENT:      Head: Normocephalic and atraumatic.      Nose: Nose normal.      Mouth/Throat:      Mouth: Mucous membranes are moist.   Eyes:      Extraocular Movements: Extraocular movements intact.      Pupils: Pupils are equal, round, and reactive to light.   Cardiovascular:      Rate and Rhythm:  Normal rate and regular rhythm.      Heart sounds: Normal heart sounds.   Pulmonary:      Effort: Pulmonary effort is normal. No respiratory distress.      Breath sounds: Normal breath sounds. No stridor. No wheezing or rhonchi.   Chest:      Chest wall: Tenderness (right intercostals) present.   Abdominal:      General: Bowel sounds are normal.      Palpations: Abdomen is soft.   Musculoskeletal:         General: No swelling, tenderness or deformity. Normal range of motion.      Cervical back: Normal and normal range of motion. No spasms or tenderness. No pain with movement. Normal range of motion.      Thoracic back: Normal.      Lumbar back: No spasms, tenderness or bony tenderness. Normal range of motion. Negative right straight leg raise test and negative left straight leg raise test. No scoliosis.      Right lower leg: No edema.      Left lower leg: No edema.   Skin:     General: Skin is warm.   Neurological:      General: No focal deficit present.      Mental Status: He is alert and oriented to person, place, and time. Mental status is at baseline.      Cranial Nerves: Cranial nerves are intact. No cranial nerve deficit.      Sensory: Sensation is intact. No sensory deficit.      Motor: No weakness.      Coordination: Coordination normal.      Gait: Gait abnormal.      Deep Tendon Reflexes: Reflexes are normal and symmetric.      Comments: Requires a wheelchair for mobility   Psychiatric:         Mood and Affect: Mood normal.         Behavior: Behavior normal.           Assessment:      1. Lumbar radiculopathy    2. Malignant neoplasm of lower lobe, right bronchus or lung    3. Cancer related pain    4. Chronic pain of both knees          Plan:  1. reviewed  2.Addiction, Dependency, Tolerance, Opioid abuse-misuse, Death, Diversion Discussed. Overdose reversal drug Naloxone discussed.  3.Refill/Continue medications for pain control and function       Requested Prescriptions     Signed Prescriptions Disp  Refills    HYDROcodone-acetaminophen (NORCO) 5-325 mg per tablet 60 tablet 0     Sig: Take 1 tablet by mouth every 12 (twelve) hours as needed for Pain.     4. Patient is not a candidate for nerve block injections  5. Follow with BRAYAN Trujillo in 1 month for re-evaluation and medication refill         report:  Reviewed and consistent with medication use as prescribed.      The total time spent for evaluation and management on 02/23/2022 including reviewing separately obtained history, performing a medically appropriate exam and evaluation, documenting clinical information in the health record, independently interpreting results and communicating them to the patient/family/caregiver, and ordering medications/tests/procedures was between 15-29 minutes.    The above plan and management options were discussed at length with patient. Patient is in agreement with the above and verbalized understanding. It will be communicated with the referring physician via electronic record, fax, or mail.

## 2022-03-04 LAB — BEAKER SEE SCANNED REPORT: NORMAL

## 2022-03-18 ENCOUNTER — OFFICE VISIT (OUTPATIENT)
Dept: FAMILY MEDICINE | Facility: CLINIC | Age: 62
End: 2022-03-18
Payer: COMMERCIAL

## 2022-03-18 VITALS
WEIGHT: 159 LBS | TEMPERATURE: 97 F | OXYGEN SATURATION: 97 % | HEIGHT: 74 IN | HEART RATE: 78 BPM | DIASTOLIC BLOOD PRESSURE: 72 MMHG | SYSTOLIC BLOOD PRESSURE: 120 MMHG | BODY MASS INDEX: 20.41 KG/M2

## 2022-03-18 DIAGNOSIS — M54.16 LUMBAR RADICULOPATHY: ICD-10-CM

## 2022-03-18 DIAGNOSIS — J44.9 CHRONIC OBSTRUCTIVE PULMONARY DISEASE, UNSPECIFIED COPD TYPE: Primary | ICD-10-CM

## 2022-03-18 DIAGNOSIS — C34.31 MALIGNANT NEOPLASM OF LOWER LOBE, RIGHT BRONCHUS OR LUNG: ICD-10-CM

## 2022-03-18 PROCEDURE — 1160F RVW MEDS BY RX/DR IN RCRD: CPT | Mod: ,,, | Performed by: FAMILY MEDICINE

## 2022-03-18 PROCEDURE — 96372 PR INJECTION,THERAP/PROPH/DIAG2ST, IM OR SUBCUT: ICD-10-PCS | Mod: ,,, | Performed by: FAMILY MEDICINE

## 2022-03-18 PROCEDURE — 3074F PR MOST RECENT SYSTOLIC BLOOD PRESSURE < 130 MM HG: ICD-10-PCS | Mod: ,,, | Performed by: FAMILY MEDICINE

## 2022-03-18 PROCEDURE — 1159F PR MEDICATION LIST DOCUMENTED IN MEDICAL RECORD: ICD-10-PCS | Mod: ,,, | Performed by: FAMILY MEDICINE

## 2022-03-18 PROCEDURE — 3008F BODY MASS INDEX DOCD: CPT | Mod: ,,, | Performed by: FAMILY MEDICINE

## 2022-03-18 PROCEDURE — 96372 THER/PROPH/DIAG INJ SC/IM: CPT | Mod: ,,, | Performed by: FAMILY MEDICINE

## 2022-03-18 PROCEDURE — 1159F MED LIST DOCD IN RCRD: CPT | Mod: ,,, | Performed by: FAMILY MEDICINE

## 2022-03-18 PROCEDURE — 99214 OFFICE O/P EST MOD 30 MIN: CPT | Mod: 25,,, | Performed by: FAMILY MEDICINE

## 2022-03-18 PROCEDURE — 3078F DIAST BP <80 MM HG: CPT | Mod: ,,, | Performed by: FAMILY MEDICINE

## 2022-03-18 PROCEDURE — 1160F PR REVIEW ALL MEDS BY PRESCRIBER/CLIN PHARMACIST DOCUMENTED: ICD-10-PCS | Mod: ,,, | Performed by: FAMILY MEDICINE

## 2022-03-18 PROCEDURE — 3074F SYST BP LT 130 MM HG: CPT | Mod: ,,, | Performed by: FAMILY MEDICINE

## 2022-03-18 PROCEDURE — 3078F PR MOST RECENT DIASTOLIC BLOOD PRESSURE < 80 MM HG: ICD-10-PCS | Mod: ,,, | Performed by: FAMILY MEDICINE

## 2022-03-18 PROCEDURE — 99214 PR OFFICE/OUTPT VISIT, EST, LEVL IV, 30-39 MIN: ICD-10-PCS | Mod: 25,,, | Performed by: FAMILY MEDICINE

## 2022-03-18 PROCEDURE — 3008F PR BODY MASS INDEX (BMI) DOCUMENTED: ICD-10-PCS | Mod: ,,, | Performed by: FAMILY MEDICINE

## 2022-03-18 RX ORDER — HYDROCHLOROTHIAZIDE 25 MG/1
25 TABLET ORAL DAILY
Qty: 90 TABLET | Refills: 0 | Status: SHIPPED | OUTPATIENT
Start: 2022-03-18 | End: 2022-04-15 | Stop reason: SDUPTHER

## 2022-03-18 RX ORDER — KETOROLAC TROMETHAMINE 30 MG/ML
30 INJECTION, SOLUTION INTRAMUSCULAR; INTRAVENOUS
Status: COMPLETED | OUTPATIENT
Start: 2022-03-18 | End: 2022-03-18

## 2022-03-18 RX ORDER — ALBUTEROL SULFATE 90 UG/1
2 AEROSOL, METERED RESPIRATORY (INHALATION) EVERY 6 HOURS PRN
Qty: 18 G | Refills: 2 | Status: SHIPPED | OUTPATIENT
Start: 2022-03-18 | End: 2022-09-16 | Stop reason: SDUPTHER

## 2022-03-18 RX ORDER — ROSUVASTATIN CALCIUM 20 MG/1
20 TABLET, COATED ORAL DAILY
Qty: 90 TABLET | Refills: 0 | Status: SHIPPED | OUTPATIENT
Start: 2022-03-18 | End: 2022-04-15 | Stop reason: SDUPTHER

## 2022-03-18 RX ORDER — APIXABAN 5 MG/1
5 TABLET, FILM COATED ORAL 2 TIMES DAILY
Qty: 180 TABLET | Refills: 0 | Status: SHIPPED | OUTPATIENT
Start: 2022-03-18 | End: 2022-09-16 | Stop reason: SDUPTHER

## 2022-03-18 RX ORDER — BENZONATATE 100 MG/1
CAPSULE ORAL
Qty: 30 CAPSULE | Refills: 0 | Status: SHIPPED | OUTPATIENT
Start: 2022-03-18 | End: 2022-06-17 | Stop reason: SDUPTHER

## 2022-03-18 RX ADMIN — KETOROLAC TROMETHAMINE 30 MG: 30 INJECTION, SOLUTION INTRAMUSCULAR; INTRAVENOUS at 08:03

## 2022-03-18 NOTE — PATIENT INSTRUCTIONS
- Take medications as prescribed  - Notify clinic if symptoms persist or worsen  - Follow up with subspecialists as scheduled

## 2022-03-23 NOTE — PROGRESS NOTES
Disclaimer:  This note has been generated using voice recognition software.  There may be type of graft focal areas that have been missed during a proof reading      Subjective:      Patient ID: Mayur Lundberg is a 61 y.o. male.    Chief Complaint: Low-back Pain and Knee Pain      Pain  This is a chronic problem. The current episode started more than 1 year ago. The problem occurs daily. The problem has been waxing and waning. Associated symptoms include arthralgias and neck pain. Pertinent negatives include no change in bowel habit, chest pain, chills, coughing, diaphoresis, fever, rash, sore throat, vertigo or vomiting.     Review of Systems   Constitutional: Negative for activity change, appetite change, chills, diaphoresis, fever and unexpected weight change.   HENT: Negative for drooling, ear discharge, ear pain, facial swelling, nosebleeds, sore throat, trouble swallowing, voice change and goiter.    Eyes: Negative for photophobia, pain, discharge, redness and visual disturbance.   Respiratory: Negative for apnea, cough, choking, chest tightness, shortness of breath, wheezing and stridor.    Cardiovascular: Negative for chest pain, palpitations and leg swelling.   Gastrointestinal: Negative for abdominal distention, change in bowel habit, diarrhea, rectal pain, vomiting, fecal incontinence and change in bowel habit.   Endocrine: Negative for cold intolerance, heat intolerance, polydipsia, polyphagia and polyuria.   Genitourinary: Negative for bladder incontinence, dysuria, flank pain and frequency.   Musculoskeletal: Positive for arthralgias, back pain, gait problem and neck pain.   Integumentary:  Negative for color change, pallor and rash.   Neurological: Negative for dizziness, vertigo, seizures, syncope, facial asymmetry, speech difficulty, light-headedness, disturbances in coordination, memory loss and coordination difficulties.   Hematological: Negative for adenopathy. Does not bruise/bleed easily.  "  Psychiatric/Behavioral: Negative for agitation, behavioral problems, confusion, decreased concentration, dysphoric mood, hallucinations, self-injury and suicidal ideas. The patient is not nervous/anxious and is not hyperactive.             Objective:  Vitals:    03/24/22 0912   BP: (!) 128/59   Pulse: 69   Resp: 19   Weight: 72.1 kg (159 lb)   Height: 6' 2" (1.88 m)   PainSc:   5         Physical Exam  Vitals and nursing note reviewed. Exam conducted with a chaperone present.   Constitutional:       General: He is awake. He is not in acute distress.     Appearance: Normal appearance. He is not ill-appearing, toxic-appearing or diaphoretic.   HENT:      Head: Normocephalic and atraumatic.      Nose: Nose normal.      Mouth/Throat:      Mouth: Mucous membranes are moist.      Pharynx: Oropharynx is clear.   Eyes:      Conjunctiva/sclera: Conjunctivae normal.      Pupils: Pupils are equal, round, and reactive to light.   Cardiovascular:      Rate and Rhythm: Normal rate.   Pulmonary:      Effort: Pulmonary effort is normal. No respiratory distress.   Chest:      Chest wall: Tenderness present.   Abdominal:      Palpations: Abdomen is soft.      Tenderness: There is no guarding.   Musculoskeletal:         General: Normal range of motion.      Cervical back: Normal range of motion and neck supple. Tenderness present. No rigidity.      Thoracic back: Tenderness present.      Lumbar back: Tenderness present.   Skin:     General: Skin is warm and dry.      Coloration: Skin is not jaundiced or pale.   Neurological:      General: No focal deficit present.      Mental Status: He is alert and oriented to person, place, and time. Mental status is at baseline.      Cranial Nerves: Cranial nerves are intact. No cranial nerve deficit (II-XII).      Gait: Gait abnormal.   Psychiatric:         Mood and Affect: Mood normal.         Behavior: Behavior normal. Behavior is cooperative.         Thought Content: Thought content normal. "           MRI Cervical Spine W WO Cont  Narrative: MRI CERVICAL SPINE W/WO CONTRAST    Indication: Extremity weakness, previous surgery     Technique: Axial and sagittal imaging of the spine is performed using  T1, T2 , STIR and T2 fat sat sequences without contrast. Post contrast  the T1-weighted sequences are performed after administration of 20 cc  Dotarem.    Comparison: 28 January 2019    Findings: Vertebral bodies are similar in height and alignment.   Anterior fusion at C4-C6 appear similar. There is been posterior  laminectomy and posterior fusion performed since previous study, appears  within normal limits.    There is cord signal hyperintensity with mild loss at the C3-4 level  especially on the left side. The signal abnormality was present in the  spinal cord on the previous study at this level.    C3-4: There is disc osteophyte complex with mild canal stenosis. There  is uncovertebral joint hypertrophy with mild bilateral foraminal  stenosis.    C5-C6: There is uncovertebral joint hypertrophy contributing to moderate  right foraminal stenosis.    C6-C7: There is disc osteophyte complex with mild central canal  stenosis. There is uncovertebral joint hypertrophy with moderate to  severe right and moderate left foraminal stenosis.    C7-T1: There is disc osteophyte complex with mild central canal  stenosis. There is uncovertebral joint and facet joint hypertrophy with  severe bilateral foraminal stenosis.    Remaining spinal cord signal appears within normal limits.      Osseous marrow signal appears within normal limits.     No abnormal enhancement is seen on the post-contrasted images when  compared to the precontrast study.  Impression: Impression: Multilevel cervical fusion and spondylitic changes as  described above. 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    This report has been electronically signed by Marlo Chaudhary  X-Ray Tibia Fibula 2 View Left  Narrative: CR SPINE  CERVICAL AP AND LATERAL    Indication: Cervicalgia     Comparison: 15 Patsy 2014    Findings: No fracture is seen. Vertebral body heights and alignment are  stable with fusion from C3-C6 unchanged from previous.    There is loss of disc space and degenerative change moderate at C6-C7  and mild to moderate at C2-3.  Impression: Impression: Surgical changes and spondylitic changes. Stable when  compared to previous.    This report has been electronically signed by Marlo Chaudhary       Office Visit on 02/23/2022   Component Date Value Ref Range Status    See Scanned Report 02/23/2022 See Scanned Result   Final           Assessment:      1. Malignant neoplasm of lower lobe, right bronchus or lung    2. Cancer related pain    3. Lumbar radiculopathy    4. Chronic pain of both knees          No orders of the defined types were placed in this encounter.        A's of Opioid Risk Assessment  Activity:Patient can perform ADL.   Analgesia:Patients pain is partially controlled by current medication. Patient has tried OTC medications such as Tylenol and Ibuprofen with out relief.   Adverse Effects: Patient denies constipation or sedation.  Aberrant Behavior:  reviewed with no aberrant drug seeking/taking behavior.  Overdose reversal drug naloxone discussed    Drug screen reviewed      Requested Prescriptions     Pending Prescriptions Disp Refills    HYDROcodone-acetaminophen (NORCO) 5-325 mg per tablet 60 tablet 0     Sig: Take 1 tablet by mouth every 12 (twelve) hours as needed for Pain.         Plan:    Motorized scooter for mobility history stroke left-sided weakness    Rheumatoid arthritis    Lung cancer, follows oncology [facility]    Has been diagnosed with a new lesion right lower lung area complaining of increasing right chest wall pain    He is requesting adjustment pain medication    Will increase [place] 5 1 p.o. q.8 hours number 90 tablets    Continue activity as tolerated    Follow-up 3 months sooner if needed      [place], February 2023    Bring original prescription medication in bottles with labels to each visit

## 2022-03-24 ENCOUNTER — OFFICE VISIT (OUTPATIENT)
Dept: PAIN MEDICINE | Facility: CLINIC | Age: 62
End: 2022-03-24
Payer: COMMERCIAL

## 2022-03-24 VITALS
RESPIRATION RATE: 19 BRPM | HEART RATE: 69 BPM | SYSTOLIC BLOOD PRESSURE: 128 MMHG | HEIGHT: 74 IN | BODY MASS INDEX: 20.41 KG/M2 | WEIGHT: 159 LBS | DIASTOLIC BLOOD PRESSURE: 59 MMHG

## 2022-03-24 DIAGNOSIS — M25.561 CHRONIC PAIN OF BOTH KNEES: Chronic | ICD-10-CM

## 2022-03-24 DIAGNOSIS — G89.29 CHRONIC PAIN OF BOTH KNEES: Chronic | ICD-10-CM

## 2022-03-24 DIAGNOSIS — M25.562 CHRONIC PAIN OF BOTH KNEES: Chronic | ICD-10-CM

## 2022-03-24 DIAGNOSIS — M54.16 LUMBAR RADICULOPATHY: Chronic | ICD-10-CM

## 2022-03-24 DIAGNOSIS — C34.31 MALIGNANT NEOPLASM OF LOWER LOBE, RIGHT BRONCHUS OR LUNG: Primary | Chronic | ICD-10-CM

## 2022-03-24 DIAGNOSIS — G89.3 CANCER RELATED PAIN: Chronic | ICD-10-CM

## 2022-03-24 PROBLEM — Z86.73 HISTORY OF CVA (CEREBROVASCULAR ACCIDENT) WITHOUT RESIDUAL DEFICITS: Chronic | Status: ACTIVE | Noted: 2021-06-29

## 2022-03-24 PROCEDURE — 3078F DIAST BP <80 MM HG: CPT | Mod: CPTII,,, | Performed by: PHYSICIAN ASSISTANT

## 2022-03-24 PROCEDURE — 99214 OFFICE O/P EST MOD 30 MIN: CPT | Mod: S$PBB,,, | Performed by: PHYSICIAN ASSISTANT

## 2022-03-24 PROCEDURE — 3078F PR MOST RECENT DIASTOLIC BLOOD PRESSURE < 80 MM HG: ICD-10-PCS | Mod: CPTII,,, | Performed by: PHYSICIAN ASSISTANT

## 2022-03-24 PROCEDURE — 1159F MED LIST DOCD IN RCRD: CPT | Mod: CPTII,,, | Performed by: PHYSICIAN ASSISTANT

## 2022-03-24 PROCEDURE — 99214 OFFICE O/P EST MOD 30 MIN: CPT | Mod: PBBFAC | Performed by: PHYSICIAN ASSISTANT

## 2022-03-24 PROCEDURE — 3008F PR BODY MASS INDEX (BMI) DOCUMENTED: ICD-10-PCS | Mod: CPTII,,, | Performed by: PHYSICIAN ASSISTANT

## 2022-03-24 PROCEDURE — 3008F BODY MASS INDEX DOCD: CPT | Mod: CPTII,,, | Performed by: PHYSICIAN ASSISTANT

## 2022-03-24 PROCEDURE — 3074F SYST BP LT 130 MM HG: CPT | Mod: CPTII,,, | Performed by: PHYSICIAN ASSISTANT

## 2022-03-24 PROCEDURE — 3074F PR MOST RECENT SYSTOLIC BLOOD PRESSURE < 130 MM HG: ICD-10-PCS | Mod: CPTII,,, | Performed by: PHYSICIAN ASSISTANT

## 2022-03-24 PROCEDURE — 1159F PR MEDICATION LIST DOCUMENTED IN MEDICAL RECORD: ICD-10-PCS | Mod: CPTII,,, | Performed by: PHYSICIAN ASSISTANT

## 2022-03-24 PROCEDURE — 99214 PR OFFICE/OUTPT VISIT, EST, LEVL IV, 30-39 MIN: ICD-10-PCS | Mod: S$PBB,,, | Performed by: PHYSICIAN ASSISTANT

## 2022-03-24 RX ORDER — HYDROCODONE BITARTRATE AND ACETAMINOPHEN 5; 325 MG/1; MG/1
1 TABLET ORAL EVERY 8 HOURS
Qty: 90 TABLET | Refills: 0 | Status: SHIPPED | OUTPATIENT
Start: 2022-03-25 | End: 2022-04-24

## 2022-03-24 RX ORDER — HYDROCODONE BITARTRATE AND ACETAMINOPHEN 5; 325 MG/1; MG/1
1 TABLET ORAL EVERY 8 HOURS
Qty: 90 TABLET | Refills: 0 | Status: SHIPPED | OUTPATIENT
Start: 2022-04-23 | End: 2022-05-23

## 2022-03-24 RX ORDER — HYDROCODONE BITARTRATE AND ACETAMINOPHEN 5; 325 MG/1; MG/1
1 TABLET ORAL EVERY 8 HOURS
Qty: 90 TABLET | Refills: 0 | Status: SHIPPED | OUTPATIENT
Start: 2022-05-24 | End: 2022-06-23

## 2022-04-11 ENCOUNTER — TELEPHONE (OUTPATIENT)
Dept: FAMILY MEDICINE | Facility: CLINIC | Age: 62
End: 2022-04-11

## 2022-04-15 ENCOUNTER — OFFICE VISIT (OUTPATIENT)
Dept: FAMILY MEDICINE | Facility: CLINIC | Age: 62
End: 2022-04-15
Payer: COMMERCIAL

## 2022-04-15 VITALS
WEIGHT: 163 LBS | OXYGEN SATURATION: 99 % | HEART RATE: 85 BPM | DIASTOLIC BLOOD PRESSURE: 56 MMHG | TEMPERATURE: 98 F | SYSTOLIC BLOOD PRESSURE: 100 MMHG | BODY MASS INDEX: 20.92 KG/M2 | HEIGHT: 74 IN

## 2022-04-15 DIAGNOSIS — C34.31 MALIGNANT NEOPLASM OF LOWER LOBE, RIGHT BRONCHUS OR LUNG: ICD-10-CM

## 2022-04-15 DIAGNOSIS — B02.9 HERPES ZOSTER WITHOUT COMPLICATION: Primary | ICD-10-CM

## 2022-04-15 PROCEDURE — 1159F PR MEDICATION LIST DOCUMENTED IN MEDICAL RECORD: ICD-10-PCS | Mod: ,,, | Performed by: FAMILY MEDICINE

## 2022-04-15 PROCEDURE — 3078F PR MOST RECENT DIASTOLIC BLOOD PRESSURE < 80 MM HG: ICD-10-PCS | Mod: ,,, | Performed by: FAMILY MEDICINE

## 2022-04-15 PROCEDURE — 3008F PR BODY MASS INDEX (BMI) DOCUMENTED: ICD-10-PCS | Mod: ,,, | Performed by: FAMILY MEDICINE

## 2022-04-15 PROCEDURE — 3008F BODY MASS INDEX DOCD: CPT | Mod: ,,, | Performed by: FAMILY MEDICINE

## 2022-04-15 PROCEDURE — 1159F MED LIST DOCD IN RCRD: CPT | Mod: ,,, | Performed by: FAMILY MEDICINE

## 2022-04-15 PROCEDURE — 1160F RVW MEDS BY RX/DR IN RCRD: CPT | Mod: ,,, | Performed by: FAMILY MEDICINE

## 2022-04-15 PROCEDURE — 3074F PR MOST RECENT SYSTOLIC BLOOD PRESSURE < 130 MM HG: ICD-10-PCS | Mod: ,,, | Performed by: FAMILY MEDICINE

## 2022-04-15 PROCEDURE — 99495 TRANSJ CARE MGMT MOD F2F 14D: CPT | Mod: ,,, | Performed by: FAMILY MEDICINE

## 2022-04-15 PROCEDURE — 1160F PR REVIEW ALL MEDS BY PRESCRIBER/CLIN PHARMACIST DOCUMENTED: ICD-10-PCS | Mod: ,,, | Performed by: FAMILY MEDICINE

## 2022-04-15 PROCEDURE — 99495 TCM SERVICES (MODERATE COMPLEXITY): ICD-10-PCS | Mod: ,,, | Performed by: FAMILY MEDICINE

## 2022-04-15 PROCEDURE — 3074F SYST BP LT 130 MM HG: CPT | Mod: ,,, | Performed by: FAMILY MEDICINE

## 2022-04-15 PROCEDURE — 3078F DIAST BP <80 MM HG: CPT | Mod: ,,, | Performed by: FAMILY MEDICINE

## 2022-04-15 RX ORDER — AMITRIPTYLINE HYDROCHLORIDE 25 MG/1
25 TABLET, FILM COATED ORAL NIGHTLY PRN
Qty: 30 TABLET | Refills: 1 | Status: SHIPPED | OUTPATIENT
Start: 2022-04-15 | End: 2022-09-16 | Stop reason: SDUPTHER

## 2022-04-15 RX ORDER — OMEPRAZOLE 20 MG/1
20 CAPSULE, DELAYED RELEASE ORAL DAILY
Qty: 90 CAPSULE | Refills: 0 | Status: SHIPPED | OUTPATIENT
Start: 2022-04-15 | End: 2022-09-16 | Stop reason: SDUPTHER

## 2022-04-15 RX ORDER — ROSUVASTATIN CALCIUM 20 MG/1
20 TABLET, COATED ORAL DAILY
Qty: 90 TABLET | Refills: 0 | Status: SHIPPED | OUTPATIENT
Start: 2022-04-15 | End: 2022-09-16 | Stop reason: SDUPTHER

## 2022-04-15 RX ORDER — HYDROCHLOROTHIAZIDE 25 MG/1
25 TABLET ORAL DAILY
Qty: 90 TABLET | Refills: 0 | Status: SHIPPED | OUTPATIENT
Start: 2022-04-15 | End: 2022-06-17 | Stop reason: SDUPTHER

## 2022-04-15 RX ORDER — VALACYCLOVIR HYDROCHLORIDE 1 G/1
1000 TABLET, FILM COATED ORAL
COMMUNITY
End: 2022-09-16 | Stop reason: SDUPTHER

## 2022-04-15 RX ORDER — DOCUSATE SODIUM 100 MG/1
100 CAPSULE, LIQUID FILLED ORAL 2 TIMES DAILY
Qty: 90 CAPSULE | Refills: 0 | Status: SHIPPED | OUTPATIENT
Start: 2022-04-15 | End: 2022-09-16 | Stop reason: SDUPTHER

## 2022-04-15 NOTE — PROGRESS NOTES
"[facility] Medicine    Chief Complaint      Chief Complaint   Patient presents with    Hospital Follow Up     [facility] [place] last week(can't remember the day). Was dx with shingles, prescribed Valtrex 1000mg ever 8hrs prn       History of Present Illness      Mayur Lundberg is a 61 y.o. male with chronic conditions of lung cancer, COPD, CVA with residual motor deficits, HTN and lumbar radiculopathy who presents today for TCC visit. Discharge summary and med rec reviewed. Briefly, pt was admitted after presenting to ED with c/o burning right side flank pain. Urinalysis was essentially normal and it was felt symptoms may be related to herpes zoster neuropathic pain. Pt was treated with home dose of gabapentin and Norco and started on 10 day course of valacyclovir. Pt states he never developed rash, but feels like pain is slightly improved. States he is still having pain intermittently that alternates between "burning, feeling ice cold, and sharp." States gabapentin has helped somewhat. States other chronic conditions stable. Has follow up with heme/onc on 4/19/22.    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    Social History:  Social History     Tobacco Use    " Smoking status: Former Smoker     Packs/day: 1.00     Types: Cigarettes    Smokeless tobacco: Never Used   Substance Use Topics    Alcohol use: Yes     Alcohol/week: 2.0 standard drinks     Types: 2 Cans of beer per week    Drug use: Never       I personally reviewed all past medical, surgical, and social.     Review of Systems   Constitutional: Negative for fatigue and fever.   HENT: Negative for ear pain.    Eyes: Negative for pain and visual disturbance.   Respiratory: Negative for chest tightness and shortness of breath.    Cardiovascular: Negative for chest pain and leg swelling.   Gastrointestinal: Negative for abdominal pain.   Genitourinary: Negative for difficulty urinating.   Musculoskeletal: Positive for myalgias.   Skin: Negative for rash.   Neurological: Negative for dizziness and light-headedness.   Hematological: Does not bruise/bleed easily.        Medications:  Outpatient Encounter Medications as of 4/15/2022   Medication Sig Dispense Refill    albuterol (VENTOLIN HFA) 90 mcg/actuation inhaler Inhale 2 puffs into the lungs every 6 (six) hours as needed for Wheezing. Rescue 18 g 2    amitriptyline (ELAVIL) 25 MG tablet Take 1 tablet (25 mg total) by mouth nightly as needed for Pain. 30 tablet 1    aspirin (ECOTRIN) 81 MG EC tablet Take 81 mg by mouth once daily.      benzonatate (TESSALON) 100 MG capsule One to two capsules three times daily as needed for cough 30 capsule 0    docusate sodium (COLACE) 100 MG capsule Take 1 capsule (100 mg total) by mouth 2 (two) times daily. 90 capsule 0    ELIQUIS 5 mg Tab Take 1 tablet (5 mg total) by mouth 2 (two) times a day. 180 tablet 0    fluticasone propionate (FLONASE) 50 mcg/actuation nasal spray fluticasone propionate 50 mcg/actuation nasal spray,suspension 16 g 2    gabapentin (NEURONTIN) 300 MG capsule Take 1 capsule (300 mg total) by mouth every 8 (eight) hours. 270 capsule 0    hydroCHLOROthiazide (HYDRODIURIL) 25 MG tablet Take 1 tablet  (25 mg total) by mouth once daily. 90 tablet 0    HYDROcodone-acetaminophen (NORCO) 5-325 mg per tablet Take 1 tablet by mouth every 8 (eight) hours. 90 tablet 0    [START ON 4/23/2022] HYDROcodone-acetaminophen (NORCO) 5-325 mg per tablet Take 1 tablet by mouth every 8 (eight) hours. 90 tablet 0    [START ON 5/24/2022] HYDROcodone-acetaminophen (NORCO) 5-325 mg per tablet Take 1 tablet by mouth every 8 (eight) hours. 90 tablet 0    levoFLOXacin (LEVAQUIN) 500 MG tablet Take 1 tablet by mouth once daily.      meloxicam (MOBIC) 7.5 MG tablet Take 1 tablet (7.5 mg total) by mouth once daily. 30 tablet 0    omeprazole (PRILOSEC) 20 MG capsule Take 1 capsule (20 mg total) by mouth once daily. 90 capsule 0    rosuvastatin (CRESTOR) 20 MG tablet Take 1 tablet (20 mg total) by mouth once daily. 90 tablet 0    SEROQUEL 50 mg tablet Take 50 mg by mouth nightly.      tiotropium-olodateroL (STIOLTO RESPIMAT) 2.5-2.5 mcg/actuation Mist Stiolto Respimat 2.5 mcg-2.5 mcg/actuation solution for inhalation 4 g 2    valACYclovir (VALTREX) 1000 MG tablet Take 1,000 mg by mouth every 6 to 8 hours as needed.      VISTARIL 25 mg Cap Take 25 mg by mouth 2 (two) times daily as needed.      [DISCONTINUED] docusate sodium (COLACE) 100 MG capsule Take 100 mg by mouth 2 (two) times daily.      [DISCONTINUED] hydroCHLOROthiazide (HYDRODIURIL) 25 MG tablet Take 1 tablet (25 mg total) by mouth once daily. 90 tablet 0    [DISCONTINUED] omeprazole (PRILOSEC) 20 MG capsule Take 1 capsule (20 mg total) by mouth once daily. 90 capsule 0    [DISCONTINUED] rosuvastatin (CRESTOR) 20 MG tablet Take 1 tablet (20 mg total) by mouth once daily. 90 tablet 0     No facility-administered encounter medications on file as of 4/15/2022.       Allergies:  Review of patient's allergies indicates:  No Known Allergies    Health Maintenance:    There is no immunization history on file for this patient.   Health Maintenance   Topic Date Due    Hepatitis  "C Screening  Never done    TETANUS VACCINE  Never done    High Dose Statin  04/15/2023    Lipid Panel  09/10/2026        Physical Exam      Vital Signs  Temp: 97.9 °F (36.6 °C)  Temp src: Oral  Pulse: 85  SpO2: 99 %  BP: (!) 100/56  BP Location: Left arm  Patient Position: Sitting  Height and Weight  Height: 6' 2" (188 cm)  Weight: 73.9 kg (163 lb)  BSA (Calculated - sq m): 1.96 sq meters  BMI (Calculated): 20.9  Weight in (lb) to have BMI = 25: 194.3]    Physical Exam  Constitutional:       Appearance: Normal appearance.   HENT:      Head: Normocephalic.   Eyes:      Conjunctiva/sclera: Conjunctivae normal.      Pupils: Pupils are equal, round, and reactive to light.   Cardiovascular:      Rate and Rhythm: Normal rate and regular rhythm.      Pulses: Normal pulses.   Pulmonary:      Effort: Pulmonary effort is normal.      Breath sounds: Normal breath sounds.   Abdominal:      General: Bowel sounds are normal. There is no distension.      Palpations: Abdomen is soft.   Musculoskeletal:         General: Normal range of motion.      Right lower leg: No edema.      Left lower leg: No edema.   Skin:     General: Skin is warm and dry.      Comments: No rash noted   Neurological:      General: No focal deficit present.      Mental Status: He is alert and oriented to person, place, and time.   Psychiatric:         Mood and Affect: Mood normal.          Laboratory:  CBC:  Recent Labs   Lab 09/10/21  1324   WBC 6.63   RBC 4.14 L   Hemoglobin 12.7 L   Hematocrit 37.5 L   Platelet Count 205   MCV 90.6   MCH 30.7   MCHC 33.9     CMP:  Recent Labs   Lab 09/10/21  1324   Glucose 74   Calcium 9.0   Sodium 140   Potassium 4.4   CO2 30   Chloride 106   BUN 14     LIPIDS:  Recent Labs   Lab 09/10/21  1324   HDL Cholesterol 70 H   Cholesterol 118   Triglycerides 55   LDL Calculated 37   Cholesterol/HDL Ratio (Risk Factor) 1.7   Non-HDL 48     TSH:      A1C:        Assessment/Plan     Mayur Lundberg is a 61 y.o.male with:     1. " Herpes zoster without complication  - Trial of amitriptyline 25 mg q.h.s..  - Continue gabapentin and Norco    2. Malignant neoplasm of lower lobe, right bronchus or lung  - Stable  - Follow up with heme/onc as scheduled      Chronic conditions status updated as per HPI.  Other than changes above, cont current medications and maintain follow up with specialists.  Return to clinic as scheduled.    Chris Bird DO  [facility]

## 2022-06-14 ENCOUNTER — OFFICE VISIT (OUTPATIENT)
Dept: PAIN MEDICINE | Facility: CLINIC | Age: 62
End: 2022-06-14
Payer: COMMERCIAL

## 2022-06-14 VITALS
BODY MASS INDEX: 21.17 KG/M2 | WEIGHT: 165 LBS | DIASTOLIC BLOOD PRESSURE: 71 MMHG | HEART RATE: 83 BPM | HEIGHT: 74 IN | SYSTOLIC BLOOD PRESSURE: 114 MMHG

## 2022-06-14 DIAGNOSIS — M25.562 CHRONIC PAIN OF BOTH KNEES: Chronic | ICD-10-CM

## 2022-06-14 DIAGNOSIS — M25.561 CHRONIC PAIN OF BOTH KNEES: Chronic | ICD-10-CM

## 2022-06-14 DIAGNOSIS — Z79.899 ENCOUNTER FOR LONG-TERM (CURRENT) USE OF OTHER MEDICATIONS: ICD-10-CM

## 2022-06-14 DIAGNOSIS — G89.3 CANCER RELATED PAIN: Chronic | ICD-10-CM

## 2022-06-14 DIAGNOSIS — G89.29 CHRONIC PAIN OF BOTH KNEES: Chronic | ICD-10-CM

## 2022-06-14 DIAGNOSIS — M54.16 LUMBAR RADICULOPATHY: Chronic | ICD-10-CM

## 2022-06-14 DIAGNOSIS — C34.31 MALIGNANT NEOPLASM OF LOWER LOBE, RIGHT BRONCHUS OR LUNG: Primary | Chronic | ICD-10-CM

## 2022-06-14 LAB

## 2022-06-14 PROCEDURE — 3074F PR MOST RECENT SYSTOLIC BLOOD PRESSURE < 130 MM HG: ICD-10-PCS | Mod: CPTII,,, | Performed by: PHYSICIAN ASSISTANT

## 2022-06-14 PROCEDURE — 1159F MED LIST DOCD IN RCRD: CPT | Mod: CPTII,,, | Performed by: PHYSICIAN ASSISTANT

## 2022-06-14 PROCEDURE — 99214 PR OFFICE/OUTPT VISIT, EST, LEVL IV, 30-39 MIN: ICD-10-PCS | Mod: S$PBB,,, | Performed by: PHYSICIAN ASSISTANT

## 2022-06-14 PROCEDURE — 3074F SYST BP LT 130 MM HG: CPT | Mod: CPTII,,, | Performed by: PHYSICIAN ASSISTANT

## 2022-06-14 PROCEDURE — G0481 PR DRUG TEST DEF 8-14 CLASSES: ICD-10-PCS | Mod: ,,, | Performed by: CLINICAL MEDICAL LABORATORY

## 2022-06-14 PROCEDURE — 3078F PR MOST RECENT DIASTOLIC BLOOD PRESSURE < 80 MM HG: ICD-10-PCS | Mod: CPTII,,, | Performed by: PHYSICIAN ASSISTANT

## 2022-06-14 PROCEDURE — 1159F PR MEDICATION LIST DOCUMENTED IN MEDICAL RECORD: ICD-10-PCS | Mod: CPTII,,, | Performed by: PHYSICIAN ASSISTANT

## 2022-06-14 PROCEDURE — 3008F BODY MASS INDEX DOCD: CPT | Mod: CPTII,,, | Performed by: PHYSICIAN ASSISTANT

## 2022-06-14 PROCEDURE — 99214 OFFICE O/P EST MOD 30 MIN: CPT | Mod: PBBFAC | Performed by: PHYSICIAN ASSISTANT

## 2022-06-14 PROCEDURE — 80305 DRUG TEST PRSMV DIR OPT OBS: CPT | Mod: PBBFAC | Performed by: PHYSICIAN ASSISTANT

## 2022-06-14 PROCEDURE — 3008F PR BODY MASS INDEX (BMI) DOCUMENTED: ICD-10-PCS | Mod: CPTII,,, | Performed by: PHYSICIAN ASSISTANT

## 2022-06-14 PROCEDURE — 99214 OFFICE O/P EST MOD 30 MIN: CPT | Mod: S$PBB,,, | Performed by: PHYSICIAN ASSISTANT

## 2022-06-14 PROCEDURE — G0481 DRUG TEST DEF 8-14 CLASSES: HCPCS | Mod: ,,, | Performed by: CLINICAL MEDICAL LABORATORY

## 2022-06-14 PROCEDURE — 3078F DIAST BP <80 MM HG: CPT | Mod: CPTII,,, | Performed by: PHYSICIAN ASSISTANT

## 2022-06-14 RX ORDER — HYDROCODONE BITARTRATE AND ACETAMINOPHEN 10; 325 MG/1; MG/1
1 TABLET ORAL EVERY 8 HOURS PRN
Qty: 90 TABLET | Refills: 0 | Status: SHIPPED | OUTPATIENT
Start: 2022-06-23 | End: 2022-07-23

## 2022-06-14 RX ORDER — HYDROCODONE BITARTRATE AND ACETAMINOPHEN 5; 325 MG/1; MG/1
1 TABLET ORAL EVERY 8 HOURS
Qty: 90 TABLET | Refills: 0 | Status: CANCELLED | OUTPATIENT
Start: 2022-06-14 | End: 2022-07-14

## 2022-06-14 RX ORDER — HYDROCODONE BITARTRATE AND ACETAMINOPHEN 10; 325 MG/1; MG/1
1 TABLET ORAL EVERY 8 HOURS PRN
Qty: 90 TABLET | Refills: 0 | Status: SHIPPED | OUTPATIENT
Start: 2022-07-23 | End: 2022-08-09 | Stop reason: SDUPTHER

## 2022-06-16 LAB
6-ACETYLMORPHINE, URINE (RUSH): NEGATIVE 10 NG/ML
7-AMINOCLONAZEPAM, URINE (RUSH): NEGATIVE 25 NG/ML
A-HYDROXYALPRAZOLAM, URINE (RUSH): NEGATIVE 25 NG/ML
ACETYL FENTANYL, URINE (RUSH): NEGATIVE 2.5 NG/ML
ACETYL NORFENTANYL OXALATE, URINE (RUSH): NEGATIVE 5 NG/ML
AMPHET UR QL SCN: NEGATIVE
BENZOYLECGONINE, URINE (RUSH): NEGATIVE 100 NG/ML
BUPRENORPHINE UR QL SCN: NEGATIVE 25 NG/ML
CODEINE, URINE (RUSH): NEGATIVE 25 NG/ML
CREAT UR-MCNC: 73 MG/DL (ref 39–259)
EDDP, URINE (RUSH): NEGATIVE 25 NG/ML
FENTANYL, URINE (RUSH): NEGATIVE 2.5 NG/ML
HYDROCODONE, URINE (RUSH): >250 25 NG/ML
HYDROMORPHONE, URINE (RUSH): 90 25 NG/ML
LORAZEPAM, URINE (RUSH): NEGATIVE 25 NG/ML
METHADONE UR QL SCN: NEGATIVE 25 NG/ML
METHAMPHET UR QL SCN: NEGATIVE
MORPHINE, URINE (RUSH): NEGATIVE 25 NG/ML
NORBUPRENORPHINE, URINE (RUSH): NEGATIVE 25 NG/ML
NORDIAZEPAM, URINE (RUSH): NEGATIVE 25 NG/ML
NORFENTANYL OXALATE, URINE (RUSH): NEGATIVE 5 NG/ML
NORHYDROCODONE, URINE (RUSH): >500 50 NG/ML
NOROXYCODONE HCL, URINE (RUSH): NEGATIVE 50 NG/ML
OXAZEPAM, URINE (RUSH): NEGATIVE 25 NG/ML
OXYCODONE UR QL SCN: NEGATIVE 25 NG/ML
OXYMORPHONE, URINE (RUSH): NEGATIVE 25 NG/ML
PH UR STRIP: 6.5 PH UNITS
SP GR UR STRIP: 1.01
TAPENTADOL, URINE (RUSH): NEGATIVE 25 NG/ML
TEMAZEPAM, URINE (RUSH): NEGATIVE 25 NG/ML
THC-COOH, URINE (RUSH): NEGATIVE 25 NG/ML
TRAMADOL, URINE (RUSH): NEGATIVE 100 NG/ML

## 2022-06-17 ENCOUNTER — OFFICE VISIT (OUTPATIENT)
Dept: FAMILY MEDICINE | Facility: CLINIC | Age: 62
End: 2022-06-17
Payer: COMMERCIAL

## 2022-06-17 VITALS
SYSTOLIC BLOOD PRESSURE: 100 MMHG | HEART RATE: 82 BPM | TEMPERATURE: 99 F | OXYGEN SATURATION: 97 % | WEIGHT: 165 LBS | DIASTOLIC BLOOD PRESSURE: 50 MMHG | HEIGHT: 74 IN | BODY MASS INDEX: 21.17 KG/M2

## 2022-06-17 DIAGNOSIS — C34.31 MALIGNANT NEOPLASM OF LOWER LOBE, RIGHT BRONCHUS OR LUNG: Primary | ICD-10-CM

## 2022-06-17 DIAGNOSIS — Z86.73 HISTORY OF CVA (CEREBROVASCULAR ACCIDENT) WITHOUT RESIDUAL DEFICITS: ICD-10-CM

## 2022-06-17 DIAGNOSIS — M54.16 LUMBAR RADICULOPATHY: ICD-10-CM

## 2022-06-17 PROCEDURE — 3008F PR BODY MASS INDEX (BMI) DOCUMENTED: ICD-10-PCS | Mod: ,,, | Performed by: FAMILY MEDICINE

## 2022-06-17 PROCEDURE — 1160F PR REVIEW ALL MEDS BY PRESCRIBER/CLIN PHARMACIST DOCUMENTED: ICD-10-PCS | Mod: ,,, | Performed by: FAMILY MEDICINE

## 2022-06-17 PROCEDURE — 99214 OFFICE O/P EST MOD 30 MIN: CPT | Mod: ,,, | Performed by: FAMILY MEDICINE

## 2022-06-17 PROCEDURE — 3074F SYST BP LT 130 MM HG: CPT | Mod: ,,, | Performed by: FAMILY MEDICINE

## 2022-06-17 PROCEDURE — 1159F MED LIST DOCD IN RCRD: CPT | Mod: ,,, | Performed by: FAMILY MEDICINE

## 2022-06-17 PROCEDURE — 1159F PR MEDICATION LIST DOCUMENTED IN MEDICAL RECORD: ICD-10-PCS | Mod: ,,, | Performed by: FAMILY MEDICINE

## 2022-06-17 PROCEDURE — 1160F RVW MEDS BY RX/DR IN RCRD: CPT | Mod: ,,, | Performed by: FAMILY MEDICINE

## 2022-06-17 PROCEDURE — 3074F PR MOST RECENT SYSTOLIC BLOOD PRESSURE < 130 MM HG: ICD-10-PCS | Mod: ,,, | Performed by: FAMILY MEDICINE

## 2022-06-17 PROCEDURE — 3008F BODY MASS INDEX DOCD: CPT | Mod: ,,, | Performed by: FAMILY MEDICINE

## 2022-06-17 PROCEDURE — 3078F DIAST BP <80 MM HG: CPT | Mod: ,,, | Performed by: FAMILY MEDICINE

## 2022-06-17 PROCEDURE — 99214 PR OFFICE/OUTPT VISIT, EST, LEVL IV, 30-39 MIN: ICD-10-PCS | Mod: ,,, | Performed by: FAMILY MEDICINE

## 2022-06-17 PROCEDURE — 3078F PR MOST RECENT DIASTOLIC BLOOD PRESSURE < 80 MM HG: ICD-10-PCS | Mod: ,,, | Performed by: FAMILY MEDICINE

## 2022-06-17 RX ORDER — BENZONATATE 100 MG/1
CAPSULE ORAL
Qty: 30 CAPSULE | Refills: 2 | Status: SHIPPED | OUTPATIENT
Start: 2022-06-17 | End: 2022-08-26 | Stop reason: SDUPTHER

## 2022-06-17 RX ORDER — HYDROCHLOROTHIAZIDE 25 MG/1
25 TABLET ORAL DAILY
Qty: 90 TABLET | Refills: 0 | Status: SHIPPED | OUTPATIENT
Start: 2022-06-17 | End: 2022-09-16 | Stop reason: SDUPTHER

## 2022-06-17 NOTE — PROGRESS NOTES
[facility] Medicine    Chief Complaint      Chief Complaint   Patient presents with    Follow-up     3 Month       History of Present Illness      Mayur Lundberg is a 61 y.o. male with chronic conditions of cancer, COPD, CVA with residual motor deficits, HTN and lumbar radiculopathy who presents today for routine follow up. Pt states he is doing well overall with no acute complaints. Established with Pain Treatment since last visit and states chronic pain is under much better control. Had follow up with heme/onc in [place] yesterday but missed appt due to transportation issues. Plans to call and reschedule. States breathing has been stable.    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    Social History:  Social History     Tobacco Use    Smoking status: Former Smoker     Packs/day: 1.00     Types: Cigarettes    Smokeless tobacco: Never Used   Substance Use Topics    Alcohol use: Yes     Alcohol/week: 2.0 standard drinks     Types: 2 Cans of beer per week    Drug use: Never       I personally reviewed all past medical, surgical, and social.     Review of Systems   Constitutional: Negative for fatigue and fever.   HENT: Negative for ear pain.    Eyes:  Negative for pain and visual disturbance.   Respiratory: Negative for chest tightness and shortness of breath.    Cardiovascular: Negative for chest pain and leg swelling.   Gastrointestinal: Negative for abdominal pain.   Genitourinary: Negative for difficulty urinating.   Musculoskeletal: Positive for arthralgias and gait problem. Negative for myalgias.   Skin: Negative for rash.   Neurological: Negative for dizziness and light-headedness.   Hematological: Does not bruise/bleed easily.        Medications:  Outpatient Encounter Medications as of 6/17/2022   Medication Sig Dispense Refill    albuterol (VENTOLIN HFA) 90 mcg/actuation inhaler Inhale 2 puffs into the lungs every 6 (six) hours as needed for Wheezing. Rescue 18 g 2    amitriptyline (ELAVIL) 25 MG tablet Take 1 tablet (25 mg total) by mouth nightly as needed for Pain. 30 tablet 1    aspirin (ECOTRIN) 81 MG EC tablet Take 81 mg by mouth once daily.      docusate sodium (COLACE) 100 MG capsule Take 1 capsule (100 mg total) by mouth 2 (two) times daily. 90 capsule 0    ELIQUIS 5 mg Tab Take 1 tablet (5 mg total) by mouth 2 (two) times a day. 180 tablet 0    fluticasone propionate (FLONASE) 50 mcg/actuation nasal spray fluticasone propionate 50 mcg/actuation nasal spray,suspension 16 g 2    gabapentin (NEURONTIN) 300 MG capsule Take 1 capsule (300 mg total) by mouth every 8 (eight) hours. 270 capsule 0    [START ON 6/23/2022] HYDROcodone-acetaminophen (NORCO)  mg per tablet Take 1 tablet by mouth every 8 (eight) hours as needed for Pain. 90 tablet 0    [START ON 7/23/2022] HYDROcodone-acetaminophen (NORCO)  mg per tablet Take 1 tablet by mouth every 8 (eight) hours as needed for Pain. 90 tablet 0    HYDROcodone-acetaminophen (NORCO) 5-325 mg per tablet Take 1 tablet by mouth every 8 (eight) hours. 90 tablet 0    levoFLOXacin (LEVAQUIN) 500 MG tablet Take 1 tablet by mouth once daily.      meloxicam (MOBIC) 7.5 MG tablet Take 1 tablet  "(7.5 mg total) by mouth once daily. 30 tablet 0    omeprazole (PRILOSEC) 20 MG capsule Take 1 capsule (20 mg total) by mouth once daily. 90 capsule 0    rosuvastatin (CRESTOR) 20 MG tablet Take 1 tablet (20 mg total) by mouth once daily. 90 tablet 0    SEROQUEL 50 mg tablet Take 50 mg by mouth nightly.      tiotropium-olodateroL (STIOLTO RESPIMAT) 2.5-2.5 mcg/actuation Mist Stiolto Respimat 2.5 mcg-2.5 mcg/actuation solution for inhalation 4 g 2    valACYclovir (VALTREX) 1000 MG tablet Take 1,000 mg by mouth every 6 to 8 hours as needed.      VISTARIL 25 mg Cap Take 25 mg by mouth 2 (two) times daily as needed.      [DISCONTINUED] benzonatate (TESSALON) 100 MG capsule One to two capsules three times daily as needed for cough 30 capsule 0    [DISCONTINUED] hydroCHLOROthiazide (HYDRODIURIL) 25 MG tablet Take 1 tablet (25 mg total) by mouth once daily. 90 tablet 0     No facility-administered encounter medications on file as of 6/17/2022.       Allergies:  Review of patient's allergies indicates:  No Known Allergies    Health Maintenance:    There is no immunization history on file for this patient.   Health Maintenance   Topic Date Due    Hepatitis C Screening  Never done    TETANUS VACCINE  Never done    High Dose Statin  06/14/2023    Lipid Panel  09/10/2026        Physical Exam      Vital Signs  Temp: 98.7 °F (37.1 °C)  Temp src: Oral  Pulse: 82  SpO2: 97 %  BP: (!) 100/50  BP Location: Left arm  Patient Position: Sitting  Height and Weight  Height: 6' 2" (188 cm)  Weight: 74.8 kg (165 lb)  BSA (Calculated - sq m): 1.98 sq meters  BMI (Calculated): 21.2  Weight in (lb) to have BMI = 25: 194.3]    Physical Exam  Constitutional:       Appearance: Normal appearance.   HENT:      Head: Normocephalic.   Eyes:      Conjunctiva/sclera: Conjunctivae normal.      Pupils: Pupils are equal, round, and reactive to light.   Cardiovascular:      Rate and Rhythm: Normal rate and regular rhythm.      Pulses: Normal " pulses.   Pulmonary:      Effort: Pulmonary effort is normal.      Breath sounds: Normal breath sounds.   Abdominal:      General: Bowel sounds are normal. There is no distension.      Palpations: Abdomen is soft.   Musculoskeletal:         General: Normal range of motion.      Right lower leg: No edema.      Left lower leg: No edema.   Skin:     General: Skin is warm and dry.   Neurological:      Mental Status: He is alert and oriented to person, place, and time. Mental status is at baseline.      Motor: Weakness present.   Psychiatric:         Mood and Affect: Mood normal.          Laboratory:  CBC:  Recent Labs   Lab 09/10/21  1324   WBC 6.63   RBC 4.14 L   Hemoglobin 12.7 L   Hematocrit 37.5 L   Platelet Count 205   MCV 90.6   MCH 30.7   MCHC 33.9     CMP:  Recent Labs   Lab 09/10/21  1324   Glucose 74   Calcium 9.0   Sodium 140   Potassium 4.4   CO2 30   Chloride 106   BUN 14     LIPIDS:  Recent Labs   Lab 09/10/21  1324   HDL Cholesterol 70 H   Cholesterol 118   Triglycerides 55   LDL Calculated 37   Cholesterol/HDL Ratio (Risk Factor) 1.7   Non-HDL 48     TSH:      A1C:        Assessment/Plan     Mayur Lundberg is a 61 y.o.male with:     1. Malignant neoplasm of lower lobe, right bronchus or lung    2. Lumbar radiculopathy    3. History of CVA (cerebrovascular accident) without residual deficits       - Chronic conditions stable  - Continue current medications  - Follow up with subspecialists as scheduled      Total time spent face-to-face and non-face-to-face coordinating care for this encounter was: 30 min    Chronic conditions status updated as per HPI.  Other than changes above, cont current medications and maintain follow up with specialists.  Return to clinic in 3 months.    Chris Bird DO  [facility]

## 2022-07-25 NOTE — PROGRESS NOTES
[facility]    Chief Complaint      Chief Complaint   Patient presents with    Follow-up     2 month follow up       History of Present Illness      Mayur Lundberg is a 61 y.o. male with chronic conditions of lung cancer, COPD, CVA with residual motor deficits, HTN and lumbar radiculopathy who presents today for routine follow up. Today pt has c/o chronic low back and knee pain. Recently established with Pain Treatment and has follow upw ith them on 3/24/22. Still having cough, but states he has not had much hemoptysis. Has follow up with heme/onc with repeat CT chest on 4/4/22. Pt states he is using Stiolto daily, but does not have albuterol inhaler.    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    Social History:  Social History     Tobacco Use    Smoking status: Former Smoker     Packs/day: 1.00     Types: Cigarettes    Smokeless tobacco: Never Used   Substance Use Topics    Alcohol use: Yes     Alcohol/week: 2.0 standard drinks     Types: 2 Cans of beer per week    Drug use: Never       I personally reviewed all past medical, surgical, and social.     Review of Systems   Constitutional: Negative for fatigue and fever.   HENT: Negative for ear  pain.    Eyes: Negative for pain and visual disturbance.   Respiratory: Positive for cough. Negative for chest tightness and shortness of breath.    Cardiovascular: Negative for chest pain and leg swelling.   Gastrointestinal: Negative for abdominal pain.   Genitourinary: Negative for difficulty urinating.   Musculoskeletal: Positive for arthralgias and back pain. Negative for gait problem and myalgias.   Skin: Negative for rash.   Neurological: Negative for dizziness and light-headedness.   Hematological: Does not bruise/bleed easily.        Medications:  Outpatient Encounter Medications as of 3/18/2022   Medication Sig Dispense Refill    aspirin (ECOTRIN) 81 MG EC tablet Take 81 mg by mouth once daily.      docusate sodium (COLACE) 100 MG capsule Take 100 mg by mouth 2 (two) times daily.      fluticasone propionate (FLONASE) 50 mcg/actuation nasal spray fluticasone propionate 50 mcg/actuation nasal spray,suspension 16 g 2    gabapentin (NEURONTIN) 300 MG capsule Take 1 capsule (300 mg total) by mouth every 8 (eight) hours. 270 capsule 0    HYDROcodone-acetaminophen (NORCO) 5-325 mg per tablet Take 1 tablet by mouth every 12 (twelve) hours as needed for Pain. 60 tablet 0    HYDROcodone-acetaminophen (NORCO) 7.5-325 mg per tablet Take 1 tablet by mouth every 6 (six) hours as needed. 20 tablet 0    levoFLOXacin (LEVAQUIN) 500 MG tablet Take 1 tablet by mouth once daily.      omeprazole (PRILOSEC) 20 MG capsule Take 1 capsule (20 mg total) by mouth once daily. 90 capsule 0    SEROQUEL 50 mg tablet Take 50 mg by mouth nightly.      tiotropium-olodateroL (STIOLTO RESPIMAT) 2.5-2.5 mcg/actuation Mist Stiolto Respimat 2.5 mcg-2.5 mcg/actuation solution for inhalation 4 g 2    VISTARIL 25 mg Cap Take 25 mg by mouth 2 (two) times daily as needed.      [DISCONTINUED] benzonatate (TESSALON) 100 MG capsule One to two capsules three times daily as needed for cough 30 capsule 0    [DISCONTINUED] ELIQUIS 5 mg Tab  "Take 1 tablet by mouth 2 (two) times a day.      [DISCONTINUED] hydroCHLOROthiazide (HYDRODIURIL) 25 MG tablet Take 1 tablet (25 mg total) by mouth once daily. 90 tablet 0    [DISCONTINUED] rosuvastatin (CRESTOR) 20 MG tablet Take 1 tablet (20 mg total) by mouth once daily. 90 tablet 0    albuterol (VENTOLIN HFA) 90 mcg/actuation inhaler Inhale 2 puffs into the lungs every 6 (six) hours as needed for Wheezing. Rescue 18 g 2    benzonatate (TESSALON) 100 MG capsule One to two capsules three times daily as needed for cough 30 capsule 0    ELIQUIS 5 mg Tab Take 1 tablet (5 mg total) by mouth 2 (two) times a day. 180 tablet 0    hydroCHLOROthiazide (HYDRODIURIL) 25 MG tablet Take 1 tablet (25 mg total) by mouth once daily. 90 tablet 0    HYDROcodone-acetaminophen (NORCO) 5-325 mg per tablet Take 1 tablet by mouth every 6 (six) hours as needed for Pain. (Patient not taking: Reported on 2/23/2022) 20 tablet 0    meloxicam (MOBIC) 7.5 MG tablet Take 1 tablet (7.5 mg total) by mouth once daily. (Patient not taking: Reported on 2/23/2022) 30 tablet 0    rosuvastatin (CRESTOR) 20 MG tablet Take 1 tablet (20 mg total) by mouth once daily. 90 tablet 0     Facility-Administered Encounter Medications as of 3/18/2022   Medication Dose Route Frequency Provider Last Rate Last Admin    ketorolac injection 30 mg  30 mg Intramuscular 1 time in Clinic/HOD Chris Bird DO           Allergies:  Review of patient's allergies indicates:  No Known Allergies    Health Maintenance:    There is no immunization history on file for this patient.   Health Maintenance   Topic Date Due    Hepatitis C Screening  Never done    TETANUS VACCINE  Never done    High Dose Statin  02/23/2023    Lipid Panel  09/10/2026        Physical Exam      Vital Signs  Temp: 97.3 °F (36.3 °C)  Pulse: 78  SpO2: 97 %  BP: 120/72  BP Location: Left arm  Patient Position: Sitting  Height and Weight  Height: 6' 2" (188 cm)  Weight: 72.1 kg (159 lb)  BSA " (Calculated - sq m): 1.94 sq meters  BMI (Calculated): 20.4  Weight in (lb) to have BMI = 25: 194.3]    Physical Exam  Constitutional:       Appearance: Normal appearance.   HENT:      Head: Normocephalic.   Eyes:      Conjunctiva/sclera: Conjunctivae normal.      Pupils: Pupils are equal, round, and reactive to light.   Cardiovascular:      Rate and Rhythm: Normal rate and regular rhythm.      Pulses: Normal pulses.   Pulmonary:      Effort: Pulmonary effort is normal.      Breath sounds: Normal breath sounds.   Abdominal:      General: Bowel sounds are normal. There is no distension.      Palpations: Abdomen is soft.   Musculoskeletal:         General: Normal range of motion.      Right lower leg: No edema.      Left lower leg: No edema.   Skin:     General: Skin is warm and dry.   Neurological:      Mental Status: He is alert and oriented to person, place, and time. Mental status is at baseline.   Psychiatric:         Mood and Affect: Mood normal.          Laboratory:  CBC:  Recent Labs   Lab 09/10/21  1324   WBC 6.63   RBC 4.14 L   Hemoglobin 12.7 L   Hematocrit 37.5 L   Platelet Count 205   MCV 90.6   MCH 30.7   MCHC 33.9     CMP:  Recent Labs   Lab 09/10/21  1324   Glucose 74   Calcium 9.0   Sodium 140   Potassium 4.4   CO2 30   Chloride 106   BUN 14     LIPIDS:  Recent Labs   Lab 09/10/21  1324   HDL Cholesterol 70 H   Cholesterol 118   Triglycerides 55   LDL Calculated 37   Cholesterol/HDL Ratio (Risk Factor) 1.7   Non-HDL 48     TSH:      A1C:        Assessment/Plan     Mayur Lundberg is a 61 y.o.male with:     1. Chronic obstructive pulmonary disease, unspecified COPD type  - Continue Stiolto  - Will restart PRN albuterol    2. Malignant neoplasm of lower lobe, right bronchus or lung  - Stable  - Follow up with heme/onc as scheduled    3. Lumbar radiculopathy  - ketorolac injection 30 mg  - Follow up with Pain Treatment as scheduled    Total time spent face-to-face and non-face-to-face coordinating care  for this encounter was: 30 min    Chronic conditions status updated as per HPI.  Other than changes above, cont current medications and maintain follow up with specialists.  Return to clinic in 3 months.    Chris Bird DO  [facility]     [FreeTextEntry1] : In regards to patients Physical exam, routine blood work drawn, will review results with patient.\par \par HLD- patient will continue Atorvastatin will test Lipid panel today \par \par Counseling included abnormal lab results, differential diagnoses, treatment options, risks and benefits, lifestyle changes, current condition, medications, and dose adjustments. \par The patient was interactive, attentive, asked questions, and verbalized understanding

## 2022-08-09 NOTE — PROGRESS NOTES
Subjective:      Patient ID: Mayur Lundberg is a 61 y.o. male.    Chief Complaint: Back Pain (That's radiates to Left knee)      Pain  This is a chronic problem. The current episode started more than 1 year ago. The problem occurs daily. The problem has been unchanged. Associated symptoms include arthralgias and neck pain. Pertinent negatives include no change in bowel habit, chest pain, chills, coughing, diaphoresis, fever, rash, sore throat, vertigo or vomiting.     Review of Systems   Constitutional: Negative for activity change, appetite change, chills, diaphoresis, fever and unexpected weight change.   HENT: Negative for drooling, ear discharge, ear pain, facial swelling, nosebleeds, sore throat, trouble swallowing, voice change and goiter.    Eyes: Negative for photophobia, pain, discharge, redness and visual disturbance.   Respiratory: Negative for apnea, cough, choking, chest tightness, shortness of breath, wheezing and stridor.    Cardiovascular: Negative for chest pain, palpitations and leg swelling.   Gastrointestinal: Negative for abdominal distention, change in bowel habit, diarrhea, rectal pain, vomiting, fecal incontinence and change in bowel habit.   Endocrine: Negative for cold intolerance, heat intolerance, polydipsia, polyphagia and polyuria.   Genitourinary: Negative for bladder incontinence, dysuria, flank pain and frequency.   Musculoskeletal: Positive for arthralgias, back pain, gait problem and neck pain.   Integumentary:  Negative for color change, pallor and rash.   Neurological: Negative for dizziness, vertigo, seizures, syncope, facial asymmetry, speech difficulty, light-headedness, disturbances in coordination, memory loss and coordination difficulties.   Hematological: Negative for adenopathy. Does not bruise/bleed easily.   Psychiatric/Behavioral: Negative for agitation, behavioral problems, confusion, decreased concentration, dysphoric mood, hallucinations, self-injury and suicidal  "ideas. The patient is not nervous/anxious and is not hyperactive.             Past Surgical History:   Procedure Laterality Date    BILATERAL C3-C7 MBB Bilateral 11/30/2011    DR MCFARLAND    CARDIAC CATHETERIZATION      EPIDURAL STEROID INJECTION INTO CERVICAL SPINE  02/15/2016    JUHI C7-8  [id number] Injection C3-7. Renetta      ESOPHAGOGASTRODUODENOSCOPY      LEFT C3-C7 MBB Left 06/08/2011    DR MCFARLAND    LEFT L4-L5 TFESI Left     X6 [id number]  DR MCFARLAND    LUMBAR EPIDURAL INJECTION      TFESI L4-5      NECK SURGERY      RIGHT C3-C7 MBB Right 2015    X2  DR MCFARLAND    SOFT TISSUE CYST EXCISION         Objective:  Vitals:    08/10/22 0915 08/10/22 0917   BP: 128/75    Pulse: 70    Resp: 19    Weight: 76.2 kg (168 lb)    Height: 6' 2" (1.88 m)    PainSc:   5   5         Physical Exam  Vitals and nursing note reviewed. Exam conducted with a chaperone present.   Constitutional:       General: He is awake. He is not in acute distress.     Appearance: Normal appearance. He is not ill-appearing, toxic-appearing or diaphoretic.   HENT:      Head: Normocephalic and atraumatic.      Nose: Nose normal.      Mouth/Throat:      Mouth: Mucous membranes are moist.      Pharynx: Oropharynx is clear.   Eyes:      Conjunctiva/sclera: Conjunctivae normal.      Pupils: Pupils are equal, round, and reactive to light.   Cardiovascular:      Rate and Rhythm: Normal rate.   Pulmonary:      Effort: Pulmonary effort is normal. No respiratory distress.   Chest:      Chest wall: Tenderness present.   Abdominal:      Palpations: Abdomen is soft.      Tenderness: There is no guarding.   Musculoskeletal:         General: Normal range of motion.      Cervical back: Normal range of motion and neck supple. Tenderness present. No rigidity.      Thoracic back: Tenderness present.      Lumbar back: Tenderness present.   Skin:     General: Skin is warm and dry.      Coloration: Skin is not jaundiced or pale.   Neurological:      " General: No focal deficit present.      Mental Status: He is alert and oriented to person, place, and time. Mental status is at baseline.      Cranial Nerves: No cranial nerve deficit (II-XII).      Gait: Gait abnormal.   Psychiatric:         Mood and Affect: Mood normal.         Behavior: Behavior normal. Behavior is cooperative.         Thought Content: Thought content normal.           MRI Cervical Spine W WO Cont  Narrative: MRI CERVICAL SPINE W/WO CONTRAST    Indication: Extremity weakness, previous surgery     Technique: Axial and sagittal imaging of the spine is performed using  T1, T2 , STIR and T2 fat sat sequences without contrast. Post contrast  the T1-weighted sequences are performed after administration of 20 cc  Dotarem.    Comparison: 28 January 2019    Findings: Vertebral bodies are similar in height and alignment.   Anterior fusion at C4-C6 appear similar. There is been posterior  laminectomy and posterior fusion performed since previous study, appears  within normal limits.    There is cord signal hyperintensity with mild loss at the C3-4 level  especially on the left side. The signal abnormality was present in the  spinal cord on the previous study at this level.    C3-4: There is disc osteophyte complex with mild canal stenosis. There  is uncovertebral joint hypertrophy with mild bilateral foraminal  stenosis.    C5-C6: There is uncovertebral joint hypertrophy contributing to moderate  right foraminal stenosis.    C6-C7: There is disc osteophyte complex with mild central canal  stenosis. There is uncovertebral joint hypertrophy with moderate to  severe right and moderate left foraminal stenosis.    C7-T1: There is disc osteophyte complex with mild central canal  stenosis. There is uncovertebral joint and facet joint hypertrophy with  severe bilateral foraminal stenosis.    Remaining spinal cord signal appears within normal limits.      Osseous marrow signal appears within normal limits.     No  abnormal enhancement is seen on the post-contrasted images when  compared to the precontrast study.  Impression: Impression: Multilevel cervical fusion and spondylitic changes as  described above. 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    This report has been electronically signed by Marlo Chaudhary  X-Ray Tibia Fibula 2 View Left  Narrative: CR SPINE CERVICAL AP AND LATERAL    Indication: Cervicalgia     Comparison: 15 Patsy 2014    Findings: No fracture is seen. Vertebral body heights and alignment are  stable with fusion from C3-C6 unchanged from previous.    There is loss of disc space and degenerative change moderate at C6-C7  and mild to moderate at C2-3.  Impression: Impression: Surgical changes and spondylitic changes. Stable when  compared to previous.    This report has been electronically signed by Marlo Chaudhary       Office Visit on 06/14/2022   Component Date Value Ref Range Status    POC Amphetamines 06/14/2022 Negative  Negative, Inconclusive Final    POC Barbiturates 06/14/2022 Negative  Negative, Inconclusive Final    POC Benzodiazepines 06/14/2022 Negative  Negative, Inconclusive Final    POC Cocaine 06/14/2022 Negative  Negative, Inconclusive Final    POC THC 06/14/2022 Negative  Negative, Inconclusive Final    POC Methadone 06/14/2022 Negative  Negative, Inconclusive Final    POC Methamphetamine 06/14/2022 Negative  Negative, Inconclusive Final    POC Opiates 06/14/2022 Negative  Negative, Inconclusive Final    POC Oxycodone 06/14/2022 Negative  Negative, Inconclusive Final    POC Phencyclidine 06/14/2022 Negative  Negative, Inconclusive Final    POC Methylenedioxymethamphetamine * 06/14/2022 Negative  Negative, Inconclusive Final    POC Tricyclic Antidepressants 06/14/2022 Negative  Negative, Inconclusive Final    POC Buprenorphine 06/14/2022 Negative   Final     Acceptable 06/14/2022 Yes   Final    POC Temperature (Urine) 06/14/2022 92    Final    pH, UA 06/14/2022 6.5  5.0, 5.5, 6.0, 6.5, 7.0, 7.5, 8.0 pH Units Final    Specific Gravity, UA 06/14/2022 1.010  <=1.005, 1.010, 1.015, 1.020, 1.025, 1.030 Final    Creatinine, Urine 06/14/2022 73  39 - 259 mg/dL Final    6-Acetylmorphine 06/14/2022 Negative  10 ng/mL Final    7-Aminoclonazepam 06/14/2022 Negative  Negative 25 ng/mL Final    a-Hydroxyalprazolam 06/14/2022 Negative  25 ng/mL Final    Acetyl Fentanyl 06/14/2022 Negative  2.5 ng/mL Final    Acetyl Norfentanyl Oxalate 06/14/2022 Negative  5 ng/mL Final    Benzoylecgonine 06/14/2022 Negative  100 ng/mL Final    Buprenorphine 06/14/2022 Negative  25 ng/mL Final    Codeine 06/14/2022 Negative  25 ng/mL Final    EDDP 06/14/2022 Negative  25 ng/mL Final    Fentanyl 06/14/2022 Negative  2.5 ng/mL Final    Hydrocodone 06/14/2022 >250.0 (A) <25.0 25 ng/mL Final    Hydromorphone 06/14/2022 90.0 (A) <25.0 25 ng/mL Final    Lorazepam 06/14/2022 Negative  25 ng/mL Final    Morphine 06/14/2022 Negative  25 ng/mL Final    Norbuprenorphine 06/14/2022 Negative  25 ng/mL Final    Nordiazepam 06/14/2022 Negative  25 ng/mL Final    Norfentanyl Oxalate 06/14/2022 Negative  5 ng/mL Final    Norhydrocodone 06/14/2022 >500.0 (A) <50.0 50 ng/mL Final    Noroxycodone HCL 06/14/2022 Negative  50 ng/mL Final    Oxazepam 06/14/2022 Negative  25 ng/mL Final    Oxymorphone 06/14/2022 Negative  25 ng/mL Final    Tapentadol 06/14/2022 Negative  25 ng/mL Final    Temazepam 06/14/2022 Negative  25 ng/mL Final    THC-COOH 06/14/2022 Negative  25 ng/mL Final    Tramadol 06/14/2022 Negative  100 ng/mL Final    Amphetamine, Urine 06/14/2022 Negative  Negative Final    Methamphetamines, Urine 06/14/2022 Negative  Negative Final    Methadone, Urine 06/14/2022 Negative  Negative 25 ng/mL Final    Oxycodone, Urine 06/14/2022 Negative  Negative 25 ng/mL Final   Office Visit on 02/23/2022   Component Date Value Ref Range Status    See Scanned Report  02/23/2022 See Scanned Result   Final           Assessment:      1. Malignant neoplasm of lower lobe, right bronchus or lung    2. Cancer related pain    3. Lumbar radiculopathy    4. Chronic pain of both knees          No orders of the defined types were placed in this encounter.        A's of Opioid Risk Assessment  Activity:Patient can perform ADL.   Analgesia:Patients pain is partially controlled by current medication. Patient has tried OTC medications such as Tylenol and Ibuprofen with out relief.   Adverse Effects: Patient denies constipation or sedation.  Aberrant Behavior:  reviewed with no aberrant drug seeking/taking behavior.  Overdose reversal drug naloxone discussed    Drug screen reviewed      Requested Prescriptions     Signed Prescriptions Disp Refills    HYDROcodone-acetaminophen (NORCO)  mg per tablet 90 tablet 0     Sig: Take 1 tablet by mouth every 8 (eight) hours as needed for Pain.    HYDROcodone-acetaminophen (NORCO)  mg per tablet 90 tablet 0     Sig: Take 1 tablet by mouth every 8 (eight) hours as needed for Pain.         Plan:    Motorized scooter for mobility history stroke left-sided weakness    Rheumatoid arthritis    Lung cancer, follows oncology [facility], he now has further metastasis lung cancer left ribcage area increasing pain he is requesting adjustment pain medication    Definitive drug screen June 2022 consistent with hydrocodone    No new complaints today states current medications helping control his discomfort    He would like to continue conservative management    Not a procedure candidate    Continue activity as tolerated    Follow-up 2 months    Dr. Masterson, February 2023    Pill count    Physical therapy  Bring original prescription medication in bottles with labels to each visit

## 2022-08-10 ENCOUNTER — OFFICE VISIT (OUTPATIENT)
Dept: PAIN MEDICINE | Facility: CLINIC | Age: 62
End: 2022-08-10
Payer: COMMERCIAL

## 2022-08-10 VITALS
RESPIRATION RATE: 19 BRPM | BODY MASS INDEX: 21.56 KG/M2 | HEIGHT: 74 IN | SYSTOLIC BLOOD PRESSURE: 128 MMHG | WEIGHT: 168 LBS | HEART RATE: 70 BPM | DIASTOLIC BLOOD PRESSURE: 75 MMHG

## 2022-08-10 DIAGNOSIS — M25.561 CHRONIC PAIN OF BOTH KNEES: Chronic | ICD-10-CM

## 2022-08-10 DIAGNOSIS — G89.29 CHRONIC PAIN OF BOTH KNEES: Chronic | ICD-10-CM

## 2022-08-10 DIAGNOSIS — C34.31 MALIGNANT NEOPLASM OF LOWER LOBE, RIGHT BRONCHUS OR LUNG: Primary | Chronic | ICD-10-CM

## 2022-08-10 DIAGNOSIS — M25.562 CHRONIC PAIN OF BOTH KNEES: Chronic | ICD-10-CM

## 2022-08-10 DIAGNOSIS — M54.16 LUMBAR RADICULOPATHY: Chronic | ICD-10-CM

## 2022-08-10 DIAGNOSIS — G89.3 CANCER RELATED PAIN: Chronic | ICD-10-CM

## 2022-08-10 PROCEDURE — 3008F BODY MASS INDEX DOCD: CPT | Mod: CPTII,,, | Performed by: PHYSICIAN ASSISTANT

## 2022-08-10 PROCEDURE — 99214 PR OFFICE/OUTPT VISIT, EST, LEVL IV, 30-39 MIN: ICD-10-PCS | Mod: S$PBB,,, | Performed by: PHYSICIAN ASSISTANT

## 2022-08-10 PROCEDURE — 3008F PR BODY MASS INDEX (BMI) DOCUMENTED: ICD-10-PCS | Mod: CPTII,,, | Performed by: PHYSICIAN ASSISTANT

## 2022-08-10 PROCEDURE — 99214 OFFICE O/P EST MOD 30 MIN: CPT | Mod: S$PBB,,, | Performed by: PHYSICIAN ASSISTANT

## 2022-08-10 PROCEDURE — 3078F PR MOST RECENT DIASTOLIC BLOOD PRESSURE < 80 MM HG: ICD-10-PCS | Mod: CPTII,,, | Performed by: PHYSICIAN ASSISTANT

## 2022-08-10 PROCEDURE — 99215 OFFICE O/P EST HI 40 MIN: CPT | Mod: PBBFAC | Performed by: PHYSICIAN ASSISTANT

## 2022-08-10 PROCEDURE — 3078F DIAST BP <80 MM HG: CPT | Mod: CPTII,,, | Performed by: PHYSICIAN ASSISTANT

## 2022-08-10 PROCEDURE — 3074F PR MOST RECENT SYSTOLIC BLOOD PRESSURE < 130 MM HG: ICD-10-PCS | Mod: CPTII,,, | Performed by: PHYSICIAN ASSISTANT

## 2022-08-10 PROCEDURE — 1159F PR MEDICATION LIST DOCUMENTED IN MEDICAL RECORD: ICD-10-PCS | Mod: CPTII,,, | Performed by: PHYSICIAN ASSISTANT

## 2022-08-10 PROCEDURE — 3074F SYST BP LT 130 MM HG: CPT | Mod: CPTII,,, | Performed by: PHYSICIAN ASSISTANT

## 2022-08-10 PROCEDURE — 1159F MED LIST DOCD IN RCRD: CPT | Mod: CPTII,,, | Performed by: PHYSICIAN ASSISTANT

## 2022-08-10 RX ORDER — HYDROCODONE BITARTRATE AND ACETAMINOPHEN 10; 325 MG/1; MG/1
1 TABLET ORAL EVERY 8 HOURS PRN
Qty: 90 TABLET | Refills: 0 | Status: SHIPPED | OUTPATIENT
Start: 2022-08-22 | End: 2022-09-21

## 2022-08-10 RX ORDER — HYDROCODONE BITARTRATE AND ACETAMINOPHEN 10; 325 MG/1; MG/1
1 TABLET ORAL EVERY 8 HOURS PRN
Qty: 90 TABLET | Refills: 0 | Status: SHIPPED | OUTPATIENT
Start: 2022-09-21 | End: 2022-10-18 | Stop reason: SDUPTHER

## 2022-08-26 RX ORDER — BENZONATATE 100 MG/1
CAPSULE ORAL
Qty: 30 CAPSULE | Refills: 2 | Status: SHIPPED | OUTPATIENT
Start: 2022-08-26 | End: 2022-09-16 | Stop reason: SDUPTHER

## 2022-09-16 ENCOUNTER — OFFICE VISIT (OUTPATIENT)
Dept: FAMILY MEDICINE | Facility: CLINIC | Age: 62
End: 2022-09-16
Payer: COMMERCIAL

## 2022-09-16 VITALS
TEMPERATURE: 98 F | DIASTOLIC BLOOD PRESSURE: 84 MMHG | WEIGHT: 163 LBS | BODY MASS INDEX: 20.92 KG/M2 | HEIGHT: 74 IN | HEART RATE: 73 BPM | OXYGEN SATURATION: 99 % | SYSTOLIC BLOOD PRESSURE: 130 MMHG

## 2022-09-16 DIAGNOSIS — I10 ESSENTIAL HYPERTENSION: ICD-10-CM

## 2022-09-16 DIAGNOSIS — B02.9 HERPES ZOSTER WITHOUT COMPLICATION: ICD-10-CM

## 2022-09-16 DIAGNOSIS — M25.562 CHRONIC PAIN OF BOTH KNEES: Chronic | ICD-10-CM

## 2022-09-16 DIAGNOSIS — M54.16 LUMBAR RADICULOPATHY: Chronic | ICD-10-CM

## 2022-09-16 DIAGNOSIS — K21.9 GASTROESOPHAGEAL REFLUX DISEASE, UNSPECIFIED WHETHER ESOPHAGITIS PRESENT: ICD-10-CM

## 2022-09-16 DIAGNOSIS — J44.9 CHRONIC OBSTRUCTIVE PULMONARY DISEASE, UNSPECIFIED COPD TYPE: Primary | ICD-10-CM

## 2022-09-16 DIAGNOSIS — G89.29 CHRONIC PAIN OF BOTH KNEES: Chronic | ICD-10-CM

## 2022-09-16 DIAGNOSIS — C34.31 MALIGNANT NEOPLASM OF LOWER LOBE, RIGHT BRONCHUS OR LUNG: Chronic | ICD-10-CM

## 2022-09-16 DIAGNOSIS — E78.5 DYSLIPIDEMIA: ICD-10-CM

## 2022-09-16 DIAGNOSIS — R05.3 CHRONIC COUGH: ICD-10-CM

## 2022-09-16 DIAGNOSIS — G47.00 INSOMNIA, UNSPECIFIED TYPE: ICD-10-CM

## 2022-09-16 DIAGNOSIS — G89.4 CHRONIC PAIN SYNDROME: ICD-10-CM

## 2022-09-16 DIAGNOSIS — Z86.73 HISTORY OF CVA (CEREBROVASCULAR ACCIDENT) WITHOUT RESIDUAL DEFICITS: ICD-10-CM

## 2022-09-16 DIAGNOSIS — M25.561 CHRONIC PAIN OF BOTH KNEES: Chronic | ICD-10-CM

## 2022-09-16 DIAGNOSIS — Z91.09 ENVIRONMENTAL ALLERGIES: ICD-10-CM

## 2022-09-16 PROBLEM — L72.3 SEBACEOUS CYST: Status: RESOLVED | Noted: 2021-05-21 | Resolved: 2022-09-16

## 2022-09-16 PROBLEM — G89.3 CANCER RELATED PAIN: Chronic | Status: ACTIVE | Noted: 2022-09-16

## 2022-09-16 PROCEDURE — 3075F SYST BP GE 130 - 139MM HG: CPT | Mod: ,,, | Performed by: NURSE PRACTITIONER

## 2022-09-16 PROCEDURE — 1159F PR MEDICATION LIST DOCUMENTED IN MEDICAL RECORD: ICD-10-PCS | Mod: ,,, | Performed by: NURSE PRACTITIONER

## 2022-09-16 PROCEDURE — 3008F PR BODY MASS INDEX (BMI) DOCUMENTED: ICD-10-PCS | Mod: ,,, | Performed by: NURSE PRACTITIONER

## 2022-09-16 PROCEDURE — 3079F PR MOST RECENT DIASTOLIC BLOOD PRESSURE 80-89 MM HG: ICD-10-PCS | Mod: ,,, | Performed by: NURSE PRACTITIONER

## 2022-09-16 PROCEDURE — 1159F MED LIST DOCD IN RCRD: CPT | Mod: ,,, | Performed by: NURSE PRACTITIONER

## 2022-09-16 PROCEDURE — 99214 PR OFFICE/OUTPT VISIT, EST, LEVL IV, 30-39 MIN: ICD-10-PCS | Mod: ,,, | Performed by: NURSE PRACTITIONER

## 2022-09-16 PROCEDURE — 1160F PR REVIEW ALL MEDS BY PRESCRIBER/CLIN PHARMACIST DOCUMENTED: ICD-10-PCS | Mod: ,,, | Performed by: NURSE PRACTITIONER

## 2022-09-16 PROCEDURE — 3079F DIAST BP 80-89 MM HG: CPT | Mod: ,,, | Performed by: NURSE PRACTITIONER

## 2022-09-16 PROCEDURE — 3008F BODY MASS INDEX DOCD: CPT | Mod: ,,, | Performed by: NURSE PRACTITIONER

## 2022-09-16 PROCEDURE — 1160F RVW MEDS BY RX/DR IN RCRD: CPT | Mod: ,,, | Performed by: NURSE PRACTITIONER

## 2022-09-16 PROCEDURE — 3075F PR MOST RECENT SYSTOLIC BLOOD PRESS GE 130-139MM HG: ICD-10-PCS | Mod: ,,, | Performed by: NURSE PRACTITIONER

## 2022-09-16 PROCEDURE — 99214 OFFICE O/P EST MOD 30 MIN: CPT | Mod: ,,, | Performed by: NURSE PRACTITIONER

## 2022-09-16 RX ORDER — MELOXICAM 7.5 MG/1
7.5 TABLET ORAL DAILY PRN
Qty: 30 TABLET | Refills: 0 | Status: SHIPPED | OUTPATIENT
Start: 2022-09-16 | End: 2022-12-02 | Stop reason: SDUPTHER

## 2022-09-16 RX ORDER — APIXABAN 5 MG/1
5 TABLET, FILM COATED ORAL 2 TIMES DAILY
Qty: 180 TABLET | Refills: 1 | Status: SHIPPED | OUTPATIENT
Start: 2022-09-16 | End: 2022-12-02 | Stop reason: SDUPTHER

## 2022-09-16 RX ORDER — LEVOFLOXACIN 500 MG/1
500 TABLET, FILM COATED ORAL DAILY
Qty: 90 TABLET | Refills: 0 | Status: CANCELLED | OUTPATIENT
Start: 2022-09-16

## 2022-09-16 RX ORDER — OMEPRAZOLE 20 MG/1
20 CAPSULE, DELAYED RELEASE ORAL DAILY
Qty: 90 CAPSULE | Refills: 1 | Status: SHIPPED | OUTPATIENT
Start: 2022-09-16 | End: 2022-12-02 | Stop reason: SDUPTHER

## 2022-09-16 RX ORDER — HYDROCODONE BITARTRATE AND ACETAMINOPHEN 10; 325 MG/1; MG/1
1 TABLET ORAL EVERY 8 HOURS PRN
Qty: 90 TABLET | Refills: 0 | Status: CANCELLED | OUTPATIENT
Start: 2022-09-16 | End: 2022-10-16

## 2022-09-16 RX ORDER — BENZONATATE 100 MG/1
CAPSULE ORAL
Qty: 60 CAPSULE | Refills: 5 | Status: SHIPPED | OUTPATIENT
Start: 2022-09-16 | End: 2022-12-02 | Stop reason: SDUPTHER

## 2022-09-16 RX ORDER — HYDROCHLOROTHIAZIDE 25 MG/1
25 TABLET ORAL DAILY
Qty: 90 TABLET | Refills: 1 | Status: SHIPPED | OUTPATIENT
Start: 2022-09-16 | End: 2022-12-02 | Stop reason: SDUPTHER

## 2022-09-16 RX ORDER — VALACYCLOVIR HYDROCHLORIDE 1 G/1
TABLET, FILM COATED ORAL
Qty: 30 TABLET | Refills: 2 | Status: SHIPPED | OUTPATIENT
Start: 2022-09-16

## 2022-09-16 RX ORDER — AMITRIPTYLINE HYDROCHLORIDE 25 MG/1
25 TABLET, FILM COATED ORAL NIGHTLY PRN
Qty: 90 TABLET | Refills: 1 | Status: SHIPPED | OUTPATIENT
Start: 2022-09-16 | End: 2022-12-02 | Stop reason: SDUPTHER

## 2022-09-16 RX ORDER — ALBUTEROL SULFATE 90 UG/1
2 AEROSOL, METERED RESPIRATORY (INHALATION) EVERY 6 HOURS PRN
Qty: 18 G | Refills: 2 | Status: SHIPPED | OUTPATIENT
Start: 2022-09-16 | End: 2022-12-02 | Stop reason: SDUPTHER

## 2022-09-16 RX ORDER — HYDROXYZINE PAMOATE 25 MG/1
25 CAPSULE ORAL 2 TIMES DAILY PRN
Qty: 180 CAPSULE | Refills: 1 | Status: SHIPPED | OUTPATIENT
Start: 2022-09-16

## 2022-09-16 RX ORDER — DOCUSATE SODIUM 100 MG/1
100 CAPSULE, LIQUID FILLED ORAL 2 TIMES DAILY
Qty: 180 CAPSULE | Refills: 1 | Status: SHIPPED | OUTPATIENT
Start: 2022-09-16

## 2022-09-16 RX ORDER — QUETIAPINE FUMARATE 50 MG/1
50 TABLET, FILM COATED ORAL NIGHTLY
Qty: 90 TABLET | Refills: 0 | Status: CANCELLED | OUTPATIENT
Start: 2022-09-16

## 2022-09-16 RX ORDER — FLUTICASONE PROPIONATE 50 MCG
SPRAY, SUSPENSION (ML) NASAL
Qty: 16 G | Refills: 2 | Status: SHIPPED | OUTPATIENT
Start: 2022-09-16

## 2022-09-16 RX ORDER — GABAPENTIN 300 MG/1
300 CAPSULE ORAL EVERY 8 HOURS
Qty: 270 CAPSULE | Refills: 1 | Status: SHIPPED | OUTPATIENT
Start: 2022-09-16 | End: 2022-12-02 | Stop reason: SDUPTHER

## 2022-09-16 RX ORDER — ROSUVASTATIN CALCIUM 20 MG/1
20 TABLET, COATED ORAL NIGHTLY
Qty: 90 TABLET | Refills: 1 | Status: SHIPPED | OUTPATIENT
Start: 2022-09-16 | End: 2022-12-02 | Stop reason: SDUPTHER

## 2022-09-16 RX ORDER — ASPIRIN 81 MG/1
81 TABLET ORAL DAILY
Qty: 90 TABLET | Refills: 1 | Status: SHIPPED | OUTPATIENT
Start: 2022-09-16 | End: 2022-12-02 | Stop reason: SDUPTHER

## 2022-09-16 NOTE — PROGRESS NOTES
[facility]          Chief Complaint        Chief Complaint   Patient presents with    Follow-up     3m follow up             History of Present Illness           Mayur Lundberg is a 61 y.o. male with chronic conditions of COPD, chronic pain syndrome, asthma, GERD, HTN, hx of lung ca, OA, and hx of CVA who presents today for routine follow up.  Pt is doing well overall.  He has a chronic cough.  He follows with his oncologist and he has an appt in Oct.  He currently is in a motorized wheelchair due to persistent muscle weakness and chronic pain syndrome related to OA to multiple joints.          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          Social History:     Social History     Tobacco Use    Smoking status: Former     Packs/day: 1.00     Types: Cigarettes    Smokeless tobacco: Never   Substance Use Topics    Alcohol use: Yes     Alcohol/week: 2.0 standard drinks     Types: 2 Cans of beer per week    Drug use: Never             I personally reviewed all past medical, surgical, and social.           Review of Systems   Constitutional: Negative.    HENT: Negative.     Eyes: Negative.    Respiratory:  Positive for cough.     Cardiovascular: Negative.    Gastrointestinal: Negative.    Endocrine: Negative.    Genitourinary: Negative.    Musculoskeletal:  Positive for arthralgias, gait problem and myalgias.   Allergic/Immunologic: Negative.    Neurological:  Positive for weakness.   Hematological: Negative.    Psychiatric/Behavioral: Negative.              Medications:     Outpatient Encounter Medications as of 9/16/2022   Medication Sig Dispense Refill    albuterol (VENTOLIN HFA) 90 mcg/actuation inhaler Inhale 2 puffs into the lungs every 6 (six) hours as needed for Wheezing. Rescue 18 g 2    amitriptyline (ELAVIL) 25 MG tablet Take 1 tablet (25 mg total) by mouth nightly as needed for Pain. 90 tablet 1    aspirin (ECOTRIN) 81 MG EC tablet Take 1 tablet (81 mg total) by mouth once daily. 90 tablet 1    benzonatate (TESSALON) 100 MG capsule One to two capsules three times daily as needed for cough 60 capsule 5    docusate sodium (COLACE) 100 MG capsule Take 1 capsule (100 mg total) by mouth 2 (two) times daily. 180 capsule 1    ELIQUIS 5 mg Tab Take 1 tablet (5 mg total) by mouth 2 (two) times a day. 180 tablet 1    fluticasone propionate (FLONASE) 50 mcg/actuation nasal spray fluticasone propionate 50 mcg/actuation nasal spray,suspension 16 g 2    gabapentin (NEURONTIN) 300 MG capsule Take 1 capsule (300 mg total) by mouth every 8 (eight) hours. 270 capsule 1    hydroCHLOROthiazide (HYDRODIURIL) 25 MG tablet Take 1 tablet (25 mg total) by mouth once daily. 90 tablet 1    HYDROcodone-acetaminophen (NORCO)  mg per tablet Take 1 tablet by mouth every 8 (eight) hours as needed for Pain. 90 tablet 0    [START ON 9/21/2022] HYDROcodone-acetaminophen (NORCO)  mg per tablet Take 1 tablet by mouth every 8 (eight) hours as needed for Pain. 90 tablet 0    meloxicam (MOBIC) 7.5 MG tablet Take 1 tablet (7.5 mg total) by mouth daily as needed for Pain. 30 tablet 0    omeprazole (PRILOSEC) 20 MG capsule Take 1 capsule (20 mg  total) by mouth once daily. 90 capsule 1    rosuvastatin (CRESTOR) 20 MG tablet Take 1 tablet (20 mg total) by mouth every evening. 90 tablet 1    SEROQUEL 50 mg tablet Take 50 mg by mouth nightly.      tiotropium-olodateroL (STIOLTO RESPIMAT) 2.5-2.5 mcg/actuation Mist Stiolto Respimat 2.5 mcg-2.5 mcg/actuation solution for inhalation 4 g 5    valACYclovir (VALTREX) 1000 MG tablet Take one tab BID for 7 days for herpes/shingles break outs. 30 tablet 2    VISTARIL 25 mg Cap Take 1 capsule (25 mg total) by mouth 2 (two) times daily as needed (itching). 180 capsule 1    [DISCONTINUED] albuterol (VENTOLIN HFA) 90 mcg/actuation inhaler Inhale 2 puffs into the lungs every 6 (six) hours as needed for Wheezing. Rescue 18 g 2    [DISCONTINUED] amitriptyline (ELAVIL) 25 MG tablet Take 1 tablet (25 mg total) by mouth nightly as needed for Pain. 30 tablet 1    [DISCONTINUED] aspirin (ECOTRIN) 81 MG EC tablet Take 81 mg by mouth once daily.      [DISCONTINUED] benzonatate (TESSALON) 100 MG capsule One to two capsules three times daily as needed for cough 30 capsule 2    [DISCONTINUED] docusate sodium (COLACE) 100 MG capsule Take 1 capsule (100 mg total) by mouth 2 (two) times daily. 90 capsule 0    [DISCONTINUED] ELIQUIS 5 mg Tab Take 1 tablet (5 mg total) by mouth 2 (two) times a day. 180 tablet 0    [DISCONTINUED] fluticasone propionate (FLONASE) 50 mcg/actuation nasal spray fluticasone propionate 50 mcg/actuation nasal spray,suspension 16 g 2    [DISCONTINUED] gabapentin (NEURONTIN) 300 MG capsule Take 1 capsule (300 mg total) by mouth every 8 (eight) hours. 270 capsule 0    [DISCONTINUED] hydroCHLOROthiazide (HYDRODIURIL) 25 MG tablet Take 1 tablet (25 mg total) by mouth once daily. 90 tablet 0    [DISCONTINUED] levoFLOXacin (LEVAQUIN) 500 MG tablet Take 1 tablet by mouth once daily.      [DISCONTINUED] meloxicam (MOBIC) 7.5 MG tablet Take 1 tablet (7.5 mg total) by mouth once daily. 30 tablet 0     "[DISCONTINUED] omeprazole (PRILOSEC) 20 MG capsule Take 1 capsule (20 mg total) by mouth once daily. 90 capsule 0    [DISCONTINUED] rosuvastatin (CRESTOR) 20 MG tablet Take 1 tablet (20 mg total) by mouth once daily. 90 tablet 0    [DISCONTINUED] tiotropium-olodateroL (STIOLTO RESPIMAT) 2.5-2.5 mcg/actuation Mist Stiolto Respimat 2.5 mcg-2.5 mcg/actuation solution for inhalation 4 g 2    [DISCONTINUED] valACYclovir (VALTREX) 1000 MG tablet Take 1,000 mg by mouth every 6 to 8 hours as needed.      [DISCONTINUED] VISTARIL 25 mg Cap Take 25 mg by mouth 2 (two) times daily as needed.       No facility-administered encounter medications on file as of 9/16/2022.             Allergies:     Review of patient's allergies indicates:  No Known Allergies          Health Maintenance:       There is no immunization history on file for this patient.      Health Maintenance   Topic Date Due    Hepatitis C Screening  Never done    TETANUS VACCINE  Never done    High Dose Statin  09/16/2023    Lipid Panel  09/10/2026              Physical Exam           Vital Signs  Temp: 98.2 °F (36.8 °C)  Temp src: Oral  Pulse: 73  SpO2: 99 %  BP: 130/84  BP Location: Left arm  Patient Position: Sitting  Height and Weight  Height: 6' 2" (188 cm)  Weight: 73.9 kg (163 lb)  BSA (Calculated - sq m): 1.96 sq meters  BMI (Calculated): 20.9  Weight in (lb) to have BMI = 25: 194.3]          Physical Exam  Vitals and nursing note reviewed.   Constitutional:       General: He is not in acute distress.     Appearance: Normal appearance. He is not ill-appearing.   HENT:      Head: Normocephalic.      Right Ear: External ear normal.      Left Ear: External ear normal.      Mouth/Throat:      Mouth: Mucous membranes are moist.   Eyes:      Conjunctiva/sclera: Conjunctivae normal.   Cardiovascular:      Rate and Rhythm: Normal rate and regular rhythm.      Pulses: Normal pulses.      Heart sounds: Normal heart sounds. No murmur heard.    No friction rub. " No gallop.   Pulmonary:      Effort: Pulmonary effort is normal. No respiratory distress.      Breath sounds: Normal breath sounds. No stridor. No wheezing, rhonchi or rales.   Chest:      Chest wall: No tenderness.   Abdominal:      General: Abdomen is flat. There is no distension.   Musculoskeletal:         General: Tenderness present. No swelling. Normal range of motion.      Cervical back: Neck supple.      Right lower leg: No edema.      Left lower leg: No edema.   Skin:     General: Skin is warm and dry.      Coloration: Skin is not jaundiced or pale.      Findings: No erythema or rash.   Neurological:      General: No focal deficit present.      Mental Status: He is alert and oriented to person, place, and time. Mental status is at baseline.      Motor: Weakness present.      Gait: Gait abnormal (uses motorized wheelchair).   Psychiatric:         Mood and Affect: Mood normal.         Behavior: Behavior normal.         Thought Content: Thought content normal.         Judgment: Judgment normal.              Laboratory:     CBC:     Recent Labs   Lab 09/10/21  1324   WBC 6.63   RBC 4.14 L   Hemoglobin 12.7 L   Hematocrit 37.5 L   Platelet Count 205   MCV 90.6   MCH 30.7   MCHC 33.9        CMP:     Recent Labs   Lab 09/10/21  1324   Glucose 74   Calcium 9.0   Sodium 140   Potassium 4.4   CO2 30   Chloride 106   BUN 14        LIPIDS:     Recent Labs   Lab 09/10/21  1324   HDL Cholesterol 70 H   Cholesterol 118   Triglycerides 55   LDL Calculated 37   Cholesterol/HDL Ratio (Risk Factor) 1.7   Non-HDL 48        TSH:            A1C:                 Assessment/Plan          Mayur Lundberg is a 61 y.o.male with:           1. Chronic obstructive pulmonary disease, unspecified COPD type  - tiotropium-olodateroL (STIOLTO RESPIMAT) 2.5-2.5 mcg/actuation Mist; Stiolto Respimat 2.5 mcg-2.5 mcg/actuation solution for inhalation  Dispense: 4 g; Refill: 5  - albuterol (VENTOLIN HFA) 90 mcg/actuation inhaler; Inhale 2 puffs  into the lungs every 6 (six) hours as needed for Wheezing. Rescue  Dispense: 18 g; Refill: 2    2. Malignant neoplasm of lower lobe, right bronchus or lung    3. Lumbar radiculopathy    4. Chronic pain of both knees  - meloxicam (MOBIC) 7.5 MG tablet; Take 1 tablet (7.5 mg total) by mouth daily as needed for Pain.  Dispense: 30 tablet; Refill: 0    5. Essential hypertension  - hydroCHLOROthiazide (HYDRODIURIL) 25 MG tablet; Take 1 tablet (25 mg total) by mouth once daily.  Dispense: 90 tablet; Refill: 1  - aspirin (ECOTRIN) 81 MG EC tablet; Take 1 tablet (81 mg total) by mouth once daily.  Dispense: 90 tablet; Refill: 1    6. Dyslipidemia  - rosuvastatin (CRESTOR) 20 MG tablet; Take 1 tablet (20 mg total) by mouth every evening.  Dispense: 90 tablet; Refill: 1    7. Gastroesophageal reflux disease, unspecified whether esophagitis present  - omeprazole (PRILOSEC) 20 MG capsule; Take 1 capsule (20 mg total) by mouth once daily.  Dispense: 90 capsule; Refill: 1    8. Herpes zoster without complication  - valACYclovir (VALTREX) 1000 MG tablet; Take one tab BID for 7 days for herpes/shingles break outs.  Dispense: 30 tablet; Refill: 2    9. History of CVA (cerebrovascular accident) without residual deficits  - ELIQUIS 5 mg Tab; Take 1 tablet (5 mg total) by mouth 2 (two) times a day.  Dispense: 180 tablet; Refill: 1    10. Chronic pain syndrome  - gabapentin (NEURONTIN) 300 MG capsule; Take 1 capsule (300 mg total) by mouth every 8 (eight) hours.  Dispense: 270 capsule; Refill: 1    11. Environmental allergies  - fluticasone propionate (FLONASE) 50 mcg/actuation nasal spray; fluticasone propionate 50 mcg/actuation nasal spray,suspension  Dispense: 16 g; Refill: 2  - VISTARIL 25 mg Cap; Take 1 capsule (25 mg total) by mouth 2 (two) times daily as needed (itching).  Dispense: 180 capsule; Refill: 1    12. Chronic cough  - benzonatate (TESSALON) 100 MG capsule; One to two capsules three times daily as needed for cough   Dispense: 60 capsule; Refill: 5    13. Insomnia, unspecified type  - amitriptyline (ELAVIL) 25 MG tablet; Take 1 tablet (25 mg total) by mouth nightly as needed for Pain.  Dispense: 90 tablet; Refill: 1             Total time spent face-to-face and non-face-to-face coordinating care for this encounter was: 30 min          Chronic conditions status updated as per HPI.  Other than changes above, cont current medications and maintain follow up with specialists.  Return to clinic in 3 months.          THOMAS Oviedo     [facility]

## 2022-10-18 ENCOUNTER — OFFICE VISIT (OUTPATIENT)
Dept: PAIN MEDICINE | Facility: CLINIC | Age: 62
End: 2022-10-18
Payer: COMMERCIAL

## 2022-10-18 VITALS
DIASTOLIC BLOOD PRESSURE: 79 MMHG | SYSTOLIC BLOOD PRESSURE: 151 MMHG | WEIGHT: 163 LBS | HEIGHT: 74 IN | BODY MASS INDEX: 20.92 KG/M2 | HEART RATE: 81 BPM

## 2022-10-18 DIAGNOSIS — G89.29 CHRONIC PAIN OF BOTH KNEES: Chronic | ICD-10-CM

## 2022-10-18 DIAGNOSIS — M25.562 CHRONIC PAIN OF BOTH KNEES: Chronic | ICD-10-CM

## 2022-10-18 DIAGNOSIS — Z79.899 ENCOUNTER FOR LONG-TERM (CURRENT) USE OF OTHER MEDICATIONS: Primary | ICD-10-CM

## 2022-10-18 DIAGNOSIS — G89.3 CANCER RELATED PAIN: Chronic | ICD-10-CM

## 2022-10-18 DIAGNOSIS — M54.16 LUMBAR RADICULOPATHY: Chronic | ICD-10-CM

## 2022-10-18 DIAGNOSIS — M25.561 CHRONIC PAIN OF BOTH KNEES: Chronic | ICD-10-CM

## 2022-10-18 DIAGNOSIS — C34.31 MALIGNANT NEOPLASM OF LOWER LOBE, RIGHT BRONCHUS OR LUNG: Chronic | ICD-10-CM

## 2022-10-18 LAB
CTP QC/QA: YES
POC (AMP) AMPHETAMINE: NEGATIVE
POC (BAR) BARBITURATES: NEGATIVE
POC (BUP) BUPRENORPHINE: NEGATIVE
POC (BZO) BENZODIAZEPINES: NEGATIVE
POC (COC) COCAINE: NEGATIVE
POC (MDMA) METHYLENEDIOXYMETHAMPHETAMINE 3,4: NEGATIVE
POC (MET) METHAMPHETAMINE: NEGATIVE
POC (MOP) OPIATES: ABNORMAL
POC (MTD) METHADONE: NEGATIVE
POC (OXY) OXYCODONE: NEGATIVE
POC (PCP) PHENCYCLIDINE: NEGATIVE
POC (TCA) TRICYCLIC ANTIDEPRESSANTS: NEGATIVE
POC TEMPERATURE (URINE): 94
POC THC: NEGATIVE

## 2022-10-18 PROCEDURE — 80305 DRUG TEST PRSMV DIR OPT OBS: CPT | Mod: PBBFAC | Performed by: PHYSICIAN ASSISTANT

## 2022-10-18 PROCEDURE — 3078F PR MOST RECENT DIASTOLIC BLOOD PRESSURE < 80 MM HG: ICD-10-PCS | Mod: CPTII,,, | Performed by: PHYSICIAN ASSISTANT

## 2022-10-18 PROCEDURE — 3077F SYST BP >= 140 MM HG: CPT | Mod: CPTII,,, | Performed by: PHYSICIAN ASSISTANT

## 2022-10-18 PROCEDURE — 1159F MED LIST DOCD IN RCRD: CPT | Mod: CPTII,,, | Performed by: PHYSICIAN ASSISTANT

## 2022-10-18 PROCEDURE — 1159F PR MEDICATION LIST DOCUMENTED IN MEDICAL RECORD: ICD-10-PCS | Mod: CPTII,,, | Performed by: PHYSICIAN ASSISTANT

## 2022-10-18 PROCEDURE — 3077F PR MOST RECENT SYSTOLIC BLOOD PRESSURE >= 140 MM HG: ICD-10-PCS | Mod: CPTII,,, | Performed by: PHYSICIAN ASSISTANT

## 2022-10-18 PROCEDURE — 99214 OFFICE O/P EST MOD 30 MIN: CPT | Mod: S$PBB,,, | Performed by: PHYSICIAN ASSISTANT

## 2022-10-18 PROCEDURE — 99214 PR OFFICE/OUTPT VISIT, EST, LEVL IV, 30-39 MIN: ICD-10-PCS | Mod: S$PBB,,, | Performed by: PHYSICIAN ASSISTANT

## 2022-10-18 PROCEDURE — 3078F DIAST BP <80 MM HG: CPT | Mod: CPTII,,, | Performed by: PHYSICIAN ASSISTANT

## 2022-10-18 PROCEDURE — 99214 OFFICE O/P EST MOD 30 MIN: CPT | Mod: PBBFAC | Performed by: PHYSICIAN ASSISTANT

## 2022-10-18 RX ORDER — HYDROCODONE BITARTRATE AND ACETAMINOPHEN 10; 325 MG/1; MG/1
1 TABLET ORAL EVERY 8 HOURS PRN
Qty: 90 TABLET | Refills: 0 | Status: SHIPPED | OUTPATIENT
Start: 2022-10-21 | End: 2022-11-20

## 2022-10-18 RX ORDER — HYDROCODONE BITARTRATE AND ACETAMINOPHEN 10; 325 MG/1; MG/1
1 TABLET ORAL EVERY 8 HOURS PRN
Qty: 90 TABLET | Refills: 0 | Status: SHIPPED | OUTPATIENT
Start: 2022-11-20 | End: 2022-11-08 | Stop reason: SDUPTHER

## 2022-10-18 NOTE — PROGRESS NOTES
Subjective:         Patient ID: Mayur Lundberg is a 61 y.o. male.    Chief Complaint: Low-back Pain      Pain  This is a chronic problem. The current episode started more than 1 year ago. The problem occurs daily. The problem has been waxing and waning. Associated symptoms include arthralgias and neck pain. Pertinent negatives include no anorexia, change in bowel habit, chest pain, coughing, diaphoresis, fever, rash, sore throat, vertigo or vomiting.   Review of Systems   Constitutional:  Negative for activity change, appetite change, diaphoresis, fever and unexpected weight change.   HENT:  Negative for drooling, ear discharge, ear pain, facial swelling, nosebleeds, sore throat, trouble swallowing, voice change and goiter.    Eyes:  Negative for photophobia, pain, discharge, redness and visual disturbance.   Respiratory:  Negative for apnea, cough, choking, chest tightness, shortness of breath, wheezing and stridor.    Cardiovascular:  Negative for chest pain, palpitations and leg swelling.   Gastrointestinal:  Negative for abdominal distention, anorexia, change in bowel habit, diarrhea, rectal pain, vomiting, fecal incontinence and change in bowel habit.   Endocrine: Negative for cold intolerance, heat intolerance, polydipsia, polyphagia and polyuria.   Genitourinary:  Negative for bladder incontinence, dysuria, flank pain and frequency.   Musculoskeletal:  Positive for arthralgias, back pain, gait problem and neck pain.   Integumentary:  Negative for color change, pallor and rash.   Neurological:  Negative for dizziness, vertigo, seizures, syncope, facial asymmetry, speech difficulty, light-headedness, coordination difficulties, memory loss and coordination difficulties.   Hematological:  Negative for adenopathy. Does not bruise/bleed easily.   Psychiatric/Behavioral:  Negative for agitation, behavioral problems, confusion, decreased concentration, dysphoric mood, hallucinations, self-injury and suicidal  "ideas. The patient is not nervous/anxious and is not hyperactive.          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 Renetta      ESOPHAGOGASTRODUODENOSCOPY      LEFT C3-C7 MBB Left 06/08/2011    DR MCFARLAND    LEFT L4-L5 TFESI Left     X6 [id number]  DR MCFARLAND    LUMBAR EPIDURAL INJECTION      TFESI L4-5      NECK SURGERY      RIGHT C3-C7 MBB Right 2015    X2  DR MCFARLAND    SOFT TISSUE CYST EXCISION       Social History     Socioeconomic History    Marital status:    Occupational History    Occupation: retired   Tobacco Use    Smoking status: Former     Packs/day: 1.00     Types: Cigarettes    Smokeless tobacco: Never   Substance and Sexual Activity    Alcohol use: Yes     Alcohol/week: 2.0 standard drinks     Types: 2 Cans of beer per week    Drug use: Never    Sexual activity: Not Currently     Family History   Problem Relation Age of Onset    Diabetes Mother     Cancer Mother     Heart disease Mother     Hypertension Mother     Diabetes Sister     Diabetes Brother     Diabetes Father     Hypertension Father     Stroke Father      Review of patient's allergies indicates:  No Known Allergies     Objective:  Vitals:    10/18/22 0830 10/18/22 0832   BP: (!) 151/79    Pulse: 81    Weight: 73.9 kg (163 lb)    Height: 6' 2.4" (1.89 m)    PainSc:   8   8         Physical Exam  Vitals and nursing note reviewed. Exam conducted with a " chaperone present.   Constitutional:       General: He is awake. He is not in acute distress.     Appearance: Normal appearance. He is not ill-appearing or toxic-appearing.   HENT:      Head: Normocephalic and atraumatic.      Nose: Nose normal.      Mouth/Throat:      Mouth: Mucous membranes are moist.      Pharynx: Oropharynx is clear.   Eyes:      Conjunctiva/sclera: Conjunctivae normal.      Pupils: Pupils are equal, round, and reactive to light.   Cardiovascular:      Rate and Rhythm: Normal rate.   Pulmonary:      Effort: Pulmonary effort is normal. No respiratory distress.   Chest:      Chest wall: Tenderness present.   Abdominal:      Palpations: Abdomen is soft.      Tenderness: There is no guarding.   Musculoskeletal:         General: Normal range of motion.      Cervical back: Normal range of motion and neck supple. Tenderness present. No rigidity.      Thoracic back: Tenderness present.      Lumbar back: Tenderness present.   Skin:     General: Skin is warm and dry.      Coloration: Skin is not jaundiced or pale.   Neurological:      General: No focal deficit present.      Mental Status: He is alert and oriented to person, place, and time. Mental status is at baseline.      Cranial Nerves: No cranial nerve deficit (II-XII).      Gait: Gait abnormal.   Psychiatric:         Mood and Affect: Mood normal.         Behavior: Behavior normal. Behavior is cooperative.         Thought Content: Thought content normal.         MRI Cervical Spine W WO Cont  Narrative: MRI CERVICAL SPINE W/WO CONTRAST    Indication: Extremity weakness, previous surgery     Technique: Axial and sagittal imaging of the spine is performed using  T1, T2 , STIR and T2 fat sat sequences without contrast. Post contrast  the T1-weighted sequences are performed after administration of 20 cc  Dotarem.    Comparison: 28 January 2019    Findings: Vertebral bodies are similar in height and alignment.   Anterior fusion at C4-C6 appear similar.  There is been posterior  laminectomy and posterior fusion performed since previous study, appears  within normal limits.    There is cord signal hyperintensity with mild loss at the C3-4 level  especially on the left side. The signal abnormality was present in the  spinal cord on the previous study at this level.    C3-4: There is disc osteophyte complex with mild canal stenosis. There  is uncovertebral joint hypertrophy with mild bilateral foraminal  stenosis.    C5-C6: There is uncovertebral joint hypertrophy contributing to moderate  right foraminal stenosis.    C6-C7: There is disc osteophyte complex with mild central canal  stenosis. There is uncovertebral joint hypertrophy with moderate to  severe right and moderate left foraminal stenosis.    C7-T1: There is disc osteophyte complex with mild central canal  stenosis. There is uncovertebral joint and facet joint hypertrophy with  severe bilateral foraminal stenosis.    Remaining spinal cord signal appears within normal limits.      Osseous marrow signal appears within normal limits.     No abnormal enhancement is seen on the post-contrasted images when  compared to the precontrast study.  Impression: Impression: Multilevel cervical fusion and spondylitic changes as  described above. 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    This report has been electronically signed by Marlo Chaudhary  X-Ray Tibia Fibula 2 View Left  Narrative: CR SPINE CERVICAL AP AND LATERAL    Indication: Cervicalgia     Comparison: 15 Patsy 2014    Findings: No fracture is seen. Vertebral body heights and alignment are  stable with fusion from C3-C6 unchanged from previous.    There is loss of disc space and degenerative change moderate at C6-C7  and mild to moderate at C2-3.  Impression: Impression: Surgical changes and spondylitic changes. Stable when  compared to previous.    This report has been electronically signed by Marlo Chaudhary       Office Visit on  06/14/2022   Component Date Value Ref Range Status    POC Amphetamines 06/14/2022 Negative  Negative, Inconclusive Final    POC Barbiturates 06/14/2022 Negative  Negative, Inconclusive Final    POC Benzodiazepines 06/14/2022 Negative  Negative, Inconclusive Final    POC Cocaine 06/14/2022 Negative  Negative, Inconclusive Final    POC THC 06/14/2022 Negative  Negative, Inconclusive Final    POC Methadone 06/14/2022 Negative  Negative, Inconclusive Final    POC Methamphetamine 06/14/2022 Negative  Negative, Inconclusive Final    POC Opiates 06/14/2022 Negative  Negative, Inconclusive Final    POC Oxycodone 06/14/2022 Negative  Negative, Inconclusive Final    POC Phencyclidine 06/14/2022 Negative  Negative, Inconclusive Final    POC Methylenedioxymethamphetamine * 06/14/2022 Negative  Negative, Inconclusive Final    POC Tricyclic Antidepressants 06/14/2022 Negative  Negative, Inconclusive Final    POC Buprenorphine 06/14/2022 Negative   Final     Acceptable 06/14/2022 Yes   Final    POC Temperature (Urine) 06/14/2022 92   Final    pH, UA 06/14/2022 6.5  5.0, 5.5, 6.0, 6.5, 7.0, 7.5, 8.0 pH Units Final    Specific Gravity, UA 06/14/2022 1.010  <=1.005, 1.010, 1.015, 1.020, 1.025, 1.030 Final    Creatinine, Urine 06/14/2022 73  39 - 259 mg/dL Final    6-Acetylmorphine 06/14/2022 Negative  10 ng/mL Final    7-Aminoclonazepam 06/14/2022 Negative  Negative 25 ng/mL Final    a-Hydroxyalprazolam 06/14/2022 Negative  25 ng/mL Final    Acetyl Fentanyl 06/14/2022 Negative  2.5 ng/mL Final    Acetyl Norfentanyl Oxalate 06/14/2022 Negative  5 ng/mL Final    Benzoylecgonine 06/14/2022 Negative  100 ng/mL Final    Buprenorphine 06/14/2022 Negative  25 ng/mL Final    Codeine 06/14/2022 Negative  25 ng/mL Final    EDDP 06/14/2022 Negative  25 ng/mL Final    Fentanyl 06/14/2022 Negative  2.5 ng/mL Final    Hydrocodone 06/14/2022 >250.0 (H)  <25.0 25 ng/mL Final    Hydromorphone 06/14/2022 90.0 (H)  <25.0 25 ng/mL Final     Lorazepam 06/14/2022 Negative  25 ng/mL Final    Morphine 06/14/2022 Negative  25 ng/mL Final    Norbuprenorphine 06/14/2022 Negative  25 ng/mL Final    Nordiazepam 06/14/2022 Negative  25 ng/mL Final    Norfentanyl Oxalate 06/14/2022 Negative  5 ng/mL Final    Norhydrocodone 06/14/2022 >500.0 (H)  <50.0 50 ng/mL Final    Noroxycodone HCL 06/14/2022 Negative  50 ng/mL Final    Oxazepam 06/14/2022 Negative  25 ng/mL Final    Oxymorphone 06/14/2022 Negative  25 ng/mL Final    Tapentadol 06/14/2022 Negative  25 ng/mL Final    Temazepam 06/14/2022 Negative  25 ng/mL Final    THC-COOH 06/14/2022 Negative  25 ng/mL Final    Tramadol 06/14/2022 Negative  100 ng/mL Final    Amphetamine, Urine 06/14/2022 Negative  Negative Final    Methamphetamines, Urine 06/14/2022 Negative  Negative Final    Methadone, Urine 06/14/2022 Negative  Negative 25 ng/mL Final    Oxycodone, Urine 06/14/2022 Negative  Negative 25 ng/mL Final         Orders Placed This Encounter   Procedures    POCT Urine Drug Screen Presump     Interpretive Information:     Negative:  No drug detected at the cut off level.   Positive:  This result represents presumptive positive for the   tested drug, other substances may yield a positive response other   than the analyte of interest. This result should be utilized for   diagnostic purpose only. Confirmation testing will be performed upon physician request only.          Requested Prescriptions     Signed Prescriptions Disp Refills    HYDROcodone-acetaminophen (NORCO)  mg per tablet 90 tablet 0     Sig: Take 1 tablet by mouth every 8 (eight) hours as needed for Pain.    HYDROcodone-acetaminophen (NORCO)  mg per tablet 90 tablet 0     Sig: Take 1 tablet by mouth every 8 (eight) hours as needed for Pain.       Assessment:     1. Encounter for long-term (current) use of other medications    2. Malignant neoplasm of lower lobe, right bronchus or lung    3. Cancer related pain    4. Lumbar radiculopathy     5. Chronic pain of both knees           A's of Opioid Risk Assessment  Activity:Patient can perform ADL.   Analgesia:Patients pain is partially controlled by current medication. Patient has tried OTC medications such as Tylenol and Ibuprofen with out relief.   Adverse Effects: Patient denies constipation or sedation.  Aberrant Behavior:  reviewed with no aberrant drug seeking/taking behavior.  Overdose reversal drug naloxone discussed    Drug screen reviewed      Plan:    Motorized scooter for mobility history stroke left-sided weakness    Rheumatoid arthritis    Lung cancer, follows oncology [facility], he now has further metastasis lung cancer left ribcage     No new complaints today states current medications helping control his discomfort    He would like to continue conservative management    Not a procedure candidate    Continue activity as tolerated    Follow-up 2 months    Dr. Masterson, February 2023    Bring original prescription medication bottles/container/box with labels to each visit    Pill count    Physical therapy    Massage therapy declines

## 2022-11-08 DIAGNOSIS — M54.16 LUMBAR RADICULOPATHY: Chronic | ICD-10-CM

## 2022-11-08 DIAGNOSIS — M25.562 CHRONIC PAIN OF BOTH KNEES: Chronic | ICD-10-CM

## 2022-11-08 DIAGNOSIS — M25.561 CHRONIC PAIN OF BOTH KNEES: Chronic | ICD-10-CM

## 2022-11-08 DIAGNOSIS — C34.31 MALIGNANT NEOPLASM OF LOWER LOBE, RIGHT BRONCHUS OR LUNG: Chronic | ICD-10-CM

## 2022-11-08 DIAGNOSIS — G89.3 CANCER RELATED PAIN: Chronic | ICD-10-CM

## 2022-11-08 DIAGNOSIS — G89.29 CHRONIC PAIN OF BOTH KNEES: Chronic | ICD-10-CM

## 2022-11-08 RX ORDER — HYDROCODONE BITARTRATE AND ACETAMINOPHEN 10; 325 MG/1; MG/1
1 TABLET ORAL EVERY 8 HOURS PRN
Qty: 90 TABLET | Refills: 0 | Status: SHIPPED | OUTPATIENT
Start: 2022-11-18 | End: 2022-11-21 | Stop reason: SDUPTHER

## 2022-11-08 NOTE — TELEPHONE ENCOUNTER
----- Message from Evelina Macdonald sent at 11/8/2022  9:27 AM CST -----  Regarding: fill date for med  Patient needs fill date on his hydrocodone changed from 11/20 because [facility] is closed that day it falls on a Sunday.[phone]

## 2022-11-09 DIAGNOSIS — Z71.89 COMPLEX CARE COORDINATION: ICD-10-CM

## 2022-11-21 DIAGNOSIS — M54.16 LUMBAR RADICULOPATHY: Chronic | ICD-10-CM

## 2022-11-21 DIAGNOSIS — C34.31 MALIGNANT NEOPLASM OF LOWER LOBE, RIGHT BRONCHUS OR LUNG: Chronic | ICD-10-CM

## 2022-11-21 DIAGNOSIS — M25.562 CHRONIC PAIN OF BOTH KNEES: Chronic | ICD-10-CM

## 2022-11-21 DIAGNOSIS — G89.3 CANCER RELATED PAIN: Chronic | ICD-10-CM

## 2022-11-21 DIAGNOSIS — G89.29 CHRONIC PAIN OF BOTH KNEES: Chronic | ICD-10-CM

## 2022-11-21 DIAGNOSIS — M25.561 CHRONIC PAIN OF BOTH KNEES: Chronic | ICD-10-CM

## 2022-11-21 RX ORDER — HYDROCODONE BITARTRATE AND ACETAMINOPHEN 10; 325 MG/1; MG/1
1 TABLET ORAL EVERY 8 HOURS PRN
Qty: 90 TABLET | Refills: 0 | Status: SHIPPED | OUTPATIENT
Start: 2022-11-21 | End: 2022-12-12 | Stop reason: SDUPTHER

## 2022-11-21 NOTE — TELEPHONE ENCOUNTER
YOLIE DELVALLE   11/21/2022   11:16 AM    Patient called requested meds sent to [facility].  D. Shows will reorder  Called [facility] pharmacy Cancelled prescription

## 2022-12-02 ENCOUNTER — OFFICE VISIT (OUTPATIENT)
Dept: FAMILY MEDICINE | Facility: CLINIC | Age: 62
End: 2022-12-02
Payer: COMMERCIAL

## 2022-12-02 VITALS
DIASTOLIC BLOOD PRESSURE: 71 MMHG | OXYGEN SATURATION: 99 % | WEIGHT: 158 LBS | HEIGHT: 74 IN | BODY MASS INDEX: 20.28 KG/M2 | HEART RATE: 85 BPM | SYSTOLIC BLOOD PRESSURE: 114 MMHG | TEMPERATURE: 99 F

## 2022-12-02 DIAGNOSIS — Z86.73 HISTORY OF CVA (CEREBROVASCULAR ACCIDENT) WITHOUT RESIDUAL DEFICITS: ICD-10-CM

## 2022-12-02 DIAGNOSIS — G89.3 CANCER RELATED PAIN: Chronic | ICD-10-CM

## 2022-12-02 DIAGNOSIS — I10 ESSENTIAL HYPERTENSION: ICD-10-CM

## 2022-12-02 DIAGNOSIS — G89.4 CHRONIC PAIN SYNDROME: ICD-10-CM

## 2022-12-02 DIAGNOSIS — M25.562 CHRONIC PAIN OF BOTH KNEES: Chronic | ICD-10-CM

## 2022-12-02 DIAGNOSIS — J44.9 CHRONIC OBSTRUCTIVE PULMONARY DISEASE, UNSPECIFIED COPD TYPE: Primary | ICD-10-CM

## 2022-12-02 DIAGNOSIS — K21.9 GASTROESOPHAGEAL REFLUX DISEASE, UNSPECIFIED WHETHER ESOPHAGITIS PRESENT: ICD-10-CM

## 2022-12-02 DIAGNOSIS — I26.99 THROMBUS OF PULMONARY VEIN: ICD-10-CM

## 2022-12-02 DIAGNOSIS — E78.5 DYSLIPIDEMIA: ICD-10-CM

## 2022-12-02 DIAGNOSIS — M54.16 LUMBAR RADICULOPATHY: ICD-10-CM

## 2022-12-02 DIAGNOSIS — M25.561 CHRONIC PAIN OF BOTH KNEES: Chronic | ICD-10-CM

## 2022-12-02 DIAGNOSIS — C34.31 MALIGNANT NEOPLASM OF LOWER LOBE, RIGHT BRONCHUS OR LUNG: ICD-10-CM

## 2022-12-02 DIAGNOSIS — G47.00 INSOMNIA, UNSPECIFIED TYPE: ICD-10-CM

## 2022-12-02 DIAGNOSIS — R05.3 CHRONIC COUGH: ICD-10-CM

## 2022-12-02 DIAGNOSIS — Z91.09 ENVIRONMENTAL ALLERGIES: ICD-10-CM

## 2022-12-02 DIAGNOSIS — G89.29 CHRONIC PAIN OF BOTH KNEES: Chronic | ICD-10-CM

## 2022-12-02 PROCEDURE — 3078F PR MOST RECENT DIASTOLIC BLOOD PRESSURE < 80 MM HG: ICD-10-PCS | Mod: ,,, | Performed by: NURSE PRACTITIONER

## 2022-12-02 PROCEDURE — 99214 PR OFFICE/OUTPT VISIT, EST, LEVL IV, 30-39 MIN: ICD-10-PCS | Mod: ,,, | Performed by: NURSE PRACTITIONER

## 2022-12-02 PROCEDURE — 3078F DIAST BP <80 MM HG: CPT | Mod: ,,, | Performed by: NURSE PRACTITIONER

## 2022-12-02 PROCEDURE — 1159F PR MEDICATION LIST DOCUMENTED IN MEDICAL RECORD: ICD-10-PCS | Mod: ,,, | Performed by: NURSE PRACTITIONER

## 2022-12-02 PROCEDURE — 3074F PR MOST RECENT SYSTOLIC BLOOD PRESSURE < 130 MM HG: ICD-10-PCS | Mod: ,,, | Performed by: NURSE PRACTITIONER

## 2022-12-02 PROCEDURE — 3074F SYST BP LT 130 MM HG: CPT | Mod: ,,, | Performed by: NURSE PRACTITIONER

## 2022-12-02 PROCEDURE — 3008F PR BODY MASS INDEX (BMI) DOCUMENTED: ICD-10-PCS | Mod: ,,, | Performed by: NURSE PRACTITIONER

## 2022-12-02 PROCEDURE — 3008F BODY MASS INDEX DOCD: CPT | Mod: ,,, | Performed by: NURSE PRACTITIONER

## 2022-12-02 PROCEDURE — 99214 OFFICE O/P EST MOD 30 MIN: CPT | Mod: ,,, | Performed by: NURSE PRACTITIONER

## 2022-12-02 PROCEDURE — 1159F MED LIST DOCD IN RCRD: CPT | Mod: ,,, | Performed by: NURSE PRACTITIONER

## 2022-12-02 RX ORDER — APIXABAN 5 MG/1
5 TABLET, FILM COATED ORAL 2 TIMES DAILY
Qty: 180 TABLET | Refills: 1 | Status: SHIPPED | OUTPATIENT
Start: 2022-12-02

## 2022-12-02 RX ORDER — QUETIAPINE FUMARATE 50 MG/1
50 TABLET, FILM COATED ORAL NIGHTLY
Qty: 90 TABLET | Refills: 1 | Status: SHIPPED | OUTPATIENT
Start: 2022-12-02

## 2022-12-02 RX ORDER — FLUTICASONE FUROATE, UMECLIDINIUM BROMIDE AND VILANTEROL TRIFENATATE 200; 62.5; 25 UG/1; UG/1; UG/1
1 POWDER RESPIRATORY (INHALATION) DAILY
Qty: 28 EACH | Refills: 5 | Status: SHIPPED | OUTPATIENT
Start: 2022-12-02

## 2022-12-02 RX ORDER — AMITRIPTYLINE HYDROCHLORIDE 25 MG/1
25 TABLET, FILM COATED ORAL NIGHTLY PRN
Qty: 90 TABLET | Refills: 1 | Status: SHIPPED | OUTPATIENT
Start: 2022-12-02

## 2022-12-02 RX ORDER — BENZONATATE 100 MG/1
CAPSULE ORAL
Qty: 60 CAPSULE | Refills: 5 | Status: SHIPPED | OUTPATIENT
Start: 2022-12-02

## 2022-12-02 RX ORDER — OMEPRAZOLE 20 MG/1
20 CAPSULE, DELAYED RELEASE ORAL DAILY
Qty: 90 CAPSULE | Refills: 1 | Status: SHIPPED | OUTPATIENT
Start: 2022-12-02

## 2022-12-02 RX ORDER — ROSUVASTATIN CALCIUM 20 MG/1
20 TABLET, COATED ORAL NIGHTLY
Qty: 90 TABLET | Refills: 1 | Status: SHIPPED | OUTPATIENT
Start: 2022-12-02

## 2022-12-02 RX ORDER — HYDROCHLOROTHIAZIDE 25 MG/1
25 TABLET ORAL DAILY
Qty: 90 TABLET | Refills: 1 | Status: SHIPPED | OUTPATIENT
Start: 2022-12-02

## 2022-12-02 RX ORDER — ALBUTEROL SULFATE 90 UG/1
2 AEROSOL, METERED RESPIRATORY (INHALATION) EVERY 6 HOURS PRN
Qty: 18 G | Refills: 2 | Status: SHIPPED | OUTPATIENT
Start: 2022-12-02 | End: 2023-12-02

## 2022-12-02 RX ORDER — PREDNISONE 20 MG/1
TABLET ORAL
COMMUNITY
Start: 2022-11-28

## 2022-12-02 RX ORDER — MONTELUKAST SODIUM 10 MG/1
10 TABLET ORAL
COMMUNITY
Start: 2022-11-28 | End: 2022-12-02 | Stop reason: SDUPTHER

## 2022-12-02 RX ORDER — GABAPENTIN 300 MG/1
300 CAPSULE ORAL EVERY 8 HOURS
Qty: 270 CAPSULE | Refills: 1 | Status: SHIPPED | OUTPATIENT
Start: 2022-12-02

## 2022-12-02 RX ORDER — ASPIRIN 81 MG/1
81 TABLET ORAL DAILY
Qty: 90 TABLET | Refills: 1 | Status: SHIPPED | OUTPATIENT
Start: 2022-12-02

## 2022-12-02 RX ORDER — ALBUTEROL SULFATE 0.63 MG/3ML
0.63 SOLUTION RESPIRATORY (INHALATION) EVERY 6 HOURS PRN
Qty: 300 ML | Refills: 5 | Status: SHIPPED | OUTPATIENT
Start: 2022-12-02

## 2022-12-02 RX ORDER — CEFUROXIME AXETIL 500 MG/1
500 TABLET ORAL 2 TIMES DAILY
COMMUNITY
Start: 2022-11-28

## 2022-12-02 RX ORDER — MONTELUKAST SODIUM 10 MG/1
10 TABLET ORAL NIGHTLY
Qty: 90 TABLET | Refills: 1 | Status: SHIPPED | OUTPATIENT
Start: 2022-12-02

## 2022-12-02 RX ORDER — MELOXICAM 7.5 MG/1
7.5 TABLET ORAL DAILY PRN
Qty: 30 TABLET | Refills: 2 | Status: SHIPPED | OUTPATIENT
Start: 2022-12-02

## 2022-12-02 NOTE — PROGRESS NOTES
"[facility] Medicine          Chief Complaint        Chief Complaint   Patient presents with    Follow-up     Hospital             History of Present Illness           Mayur Lundberg is a 61 y.o. male with chronic conditions of HTN, COPD, HLD, GERD, chronic pain syndrome, active lung cancer on chemo, and OA who presents today for routine follow. Pt was recently discharged from [facility].  I do not have any records to review. Pt states he was admitted for respiratory failure and was on a "breathing machine". Pt is following with Dr. Werner in [place] for his chemo.           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          Social History:     Social History     Tobacco Use    Smoking status: Some Days     Packs/day: 1.00     Types: Cigarettes    Smokeless tobacco: Never   Substance Use Topics    Alcohol use: Yes     Alcohol/week: 2.0 standard drinks     Types: 2 Cans of beer per week    Drug use: Never             I personally reviewed all past medical, surgical, and social.           Review of Systems   Constitutional:  Positive for fatigue.   HENT: Negative.     Eyes: Negative.    Respiratory:  Positive for shortness of breath.    Cardiovascular: Negative.  "   Gastrointestinal: Negative.    Endocrine: Negative.    Genitourinary: Negative.    Musculoskeletal:  Positive for arthralgias, back pain, gait problem and myalgias.   Allergic/Immunologic: Negative.    Neurological:  Positive for weakness.   Hematological: Negative.    Psychiatric/Behavioral: Negative.              Medications:     Outpatient Encounter Medications as of 12/2/2022   Medication Sig Dispense Refill    albuterol (ACCUNEB) 0.63 mg/3 mL Nebu Take 3 mLs (0.63 mg total) by nebulization every 6 (six) hours as needed (SOB, wheezing). Rescue 300 mL 5    albuterol (VENTOLIN HFA) 90 mcg/actuation inhaler Inhale 2 puffs into the lungs every 6 (six) hours as needed for Wheezing. Rescue 18 g 2    amitriptyline (ELAVIL) 25 MG tablet Take 1 tablet (25 mg total) by mouth nightly as needed for Pain. 90 tablet 1    aspirin (ECOTRIN) 81 MG EC tablet Take 1 tablet (81 mg total) by mouth once daily. 90 tablet 1    benzonatate (TESSALON) 100 MG capsule One to two capsules three times daily as needed for cough 60 capsule 5    cefUROXime (CEFTIN) 500 MG tablet Take 500 mg by mouth 2 (two) times daily.      docusate sodium (COLACE) 100 MG capsule Take 1 capsule (100 mg total) by mouth 2 (two) times daily. 180 capsule 1    ELIQUIS 5 mg Tab Take 1 tablet (5 mg total) by mouth 2 (two) times a day. 180 tablet 1    fluticasone propionate (FLONASE) 50 mcg/actuation nasal spray fluticasone propionate 50 mcg/actuation nasal spray,suspension 16 g 2    fluticasone-umeclidin-vilanter (TRELEGY ELLIPTA) 200-62.5-25 mcg inhaler Inhale 1 puff into the lungs once daily. 28 each 5    gabapentin (NEURONTIN) 300 MG capsule Take 1 capsule (300 mg total) by mouth every 8 (eight) hours. 270 capsule 1    hydroCHLOROthiazide (HYDRODIURIL) 25 MG tablet Take 1 tablet (25 mg total) by mouth once daily. 90 tablet 1    HYDROcodone-acetaminophen (NORCO)  mg per tablet Take 1 tablet by mouth every 8 (eight) hours as needed for Pain. 90 tablet 0     meloxicam (MOBIC) 7.5 MG tablet Take 1 tablet (7.5 mg total) by mouth daily as needed for Pain. 30 tablet 2    montelukast (SINGULAIR) 10 mg tablet Take 1 tablet (10 mg total) by mouth every evening. 90 tablet 1    omeprazole (PRILOSEC) 20 MG capsule Take 1 capsule (20 mg total) by mouth once daily. 90 capsule 1    predniSONE (DELTASONE) 20 MG tablet Take by mouth.      rosuvastatin (CRESTOR) 20 MG tablet Take 1 tablet (20 mg total) by mouth every evening. 90 tablet 1    SEROQUEL 50 mg tablet Take 1 tablet (50 mg total) by mouth nightly. 90 tablet 1    tiotropium-olodateroL (STIOLTO RESPIMAT) 2.5-2.5 mcg/actuation Mist Stiolto Respimat 2.5 mcg-2.5 mcg/actuation solution for inhalation 4 g 5    valACYclovir (VALTREX) 1000 MG tablet Take one tab BID for 7 days for herpes/shingles break outs. 30 tablet 2    VISTARIL 25 mg Cap Take 1 capsule (25 mg total) by mouth 2 (two) times daily as needed (itching). 180 capsule 1    [DISCONTINUED] albuterol (VENTOLIN HFA) 90 mcg/actuation inhaler Inhale 2 puffs into the lungs every 6 (six) hours as needed for Wheezing. Rescue 18 g 2    [DISCONTINUED] amitriptyline (ELAVIL) 25 MG tablet Take 1 tablet (25 mg total) by mouth nightly as needed for Pain. 90 tablet 1    [DISCONTINUED] aspirin (ECOTRIN) 81 MG EC tablet Take 1 tablet (81 mg total) by mouth once daily. 90 tablet 1    [DISCONTINUED] benzonatate (TESSALON) 100 MG capsule One to two capsules three times daily as needed for cough 60 capsule 5    [DISCONTINUED] ELIQUIS 5 mg Tab Take 1 tablet (5 mg total) by mouth 2 (two) times a day. 180 tablet 1    [DISCONTINUED] gabapentin (NEURONTIN) 300 MG capsule Take 1 capsule (300 mg total) by mouth every 8 (eight) hours. 270 capsule 1    [DISCONTINUED] hydroCHLOROthiazide (HYDRODIURIL) 25 MG tablet Take 1 tablet (25 mg total) by mouth once daily. 90 tablet 1    [DISCONTINUED] meloxicam (MOBIC) 7.5 MG tablet Take 1 tablet (7.5 mg total) by mouth daily as needed for Pain. 30 tablet 0     "[DISCONTINUED] montelukast (SINGULAIR) 10 mg tablet Take 10 mg by mouth.      [DISCONTINUED] omeprazole (PRILOSEC) 20 MG capsule Take 1 capsule (20 mg total) by mouth once daily. 90 capsule 1    [DISCONTINUED] rosuvastatin (CRESTOR) 20 MG tablet Take 1 tablet (20 mg total) by mouth every evening. 90 tablet 1    [DISCONTINUED] SEROQUEL 50 mg tablet Take 50 mg by mouth nightly.       No facility-administered encounter medications on file as of 12/2/2022.             Allergies:     Review of patient's allergies indicates:  No Known Allergies          Health Maintenance:       There is no immunization history on file for this patient.      Health Maintenance   Topic Date Due    Hepatitis C Screening  Never done    TETANUS VACCINE  Never done    High Dose Statin  10/18/2023    Lipid Panel  09/10/2026              Physical Exam           Vital Signs  Temp: 98.6 °F (37 °C)  Temp src: Oral  Pulse: 85  SpO2: 99 %  BP: 114/71  BP Location: Right arm  Patient Position: Sitting  Height and Weight  Height: 6' 2.4" (189 cm)  Weight: 71.7 kg (158 lb)  BSA (Calculated - sq m): 1.94 sq meters  BMI (Calculated): 20.1  Weight in (lb) to have BMI = 25: 196.4]          Physical Exam  Vitals and nursing note reviewed.   Constitutional:       General: He is not in acute distress.     Appearance: Normal appearance. He is not ill-appearing.   HENT:      Head: Normocephalic.      Right Ear: External ear normal.      Left Ear: External ear normal.      Mouth/Throat:      Mouth: Mucous membranes are moist.   Eyes:      Conjunctiva/sclera: Conjunctivae normal.   Cardiovascular:      Rate and Rhythm: Normal rate and regular rhythm.      Pulses: Normal pulses.      Heart sounds: Normal heart sounds. No murmur heard.    No friction rub. No gallop.   Pulmonary:      Effort: Pulmonary effort is normal. No respiratory distress.      Breath sounds: No stridor. Wheezing present. No rhonchi or rales.   Chest:      Chest wall: No tenderness.   Abdominal: "      General: Abdomen is flat. There is no distension.   Musculoskeletal:         General: Tenderness (generalized joint and muscle pains) present. No swelling. Normal range of motion.      Cervical back: Neck supple.      Right lower leg: No edema.      Left lower leg: No edema.   Skin:     General: Skin is warm and dry.      Coloration: Skin is not jaundiced or pale.      Findings: No erythema or rash.   Neurological:      General: No focal deficit present.      Mental Status: He is alert and oriented to person, place, and time. Mental status is at baseline.      Motor: Weakness present.      Gait: Gait abnormal (uses wheelchair for gait asst).   Psychiatric:         Mood and Affect: Mood normal.         Behavior: Behavior normal.         Thought Content: Thought content normal.         Judgment: Judgment normal.              Laboratory:     CBC:     Recent Labs   Lab 09/10/21  1324   WBC 6.63   RBC 4.14 L   Hemoglobin 12.7 L   Hematocrit 37.5 L   Platelet Count 205   MCV 90.6   MCH 30.7   MCHC 33.9        CMP:     Recent Labs   Lab 09/10/21  1324   Glucose 74   Calcium 9.0   Sodium 140   Potassium 4.4   CO2 30   Chloride 106   BUN 14        LIPIDS:     Recent Labs   Lab 09/10/21  1324   HDL Cholesterol 70 H   Cholesterol 118   Triglycerides 55   LDL Calculated 37   Cholesterol/HDL Ratio (Risk Factor) 1.7   Non-HDL 48        TSH:            A1C:                 Assessment/Plan          Mayur Lundberg is a 61 y.o.male with:           1. Chronic obstructive pulmonary disease, unspecified COPD type  - fluticasone-umeclidin-vilanter (TRELEGY ELLIPTA) 200-62.5-25 mcg inhaler; Inhale 1 puff into the lungs once daily.  Dispense: 28 each; Refill: 5  - albuterol (VENTOLIN HFA) 90 mcg/actuation inhaler; Inhale 2 puffs into the lungs every 6 (six) hours as needed for Wheezing. Rescue  Dispense: 18 g; Refill: 2  - albuterol (ACCUNEB) 0.63 mg/3 mL Nebu; Take 3 mLs (0.63 mg total) by nebulization every 6 (six) hours as  needed (SOB, wheezing). Rescue  Dispense: 300 mL; Refill: 5    2. Malignant neoplasm of lower lobe, right bronchus or lung    3. Insomnia, unspecified type  - amitriptyline (ELAVIL) 25 MG tablet; Take 1 tablet (25 mg total) by mouth nightly as needed for Pain.  Dispense: 90 tablet; Refill: 1  - SEROQUEL 50 mg tablet; Take 1 tablet (50 mg total) by mouth nightly.  Dispense: 90 tablet; Refill: 1    4. Essential hypertension  - aspirin (ECOTRIN) 81 MG EC tablet; Take 1 tablet (81 mg total) by mouth once daily.  Dispense: 90 tablet; Refill: 1  - hydroCHLOROthiazide (HYDRODIURIL) 25 MG tablet; Take 1 tablet (25 mg total) by mouth once daily.  Dispense: 90 tablet; Refill: 1    5. Chronic cough  - benzonatate (TESSALON) 100 MG capsule; One to two capsules three times daily as needed for cough  Dispense: 60 capsule; Refill: 5    6. History of CVA (cerebrovascular accident) without residual deficits  - ELIQUIS 5 mg Tab; Take 1 tablet (5 mg total) by mouth 2 (two) times a day.  Dispense: 180 tablet; Refill: 1    7. Chronic pain syndrome  - gabapentin (NEURONTIN) 300 MG capsule; Take 1 capsule (300 mg total) by mouth every 8 (eight) hours.  Dispense: 270 capsule; Refill: 1    8. Chronic pain of both knees  - meloxicam (MOBIC) 7.5 MG tablet; Take 1 tablet (7.5 mg total) by mouth daily as needed for Pain.  Dispense: 30 tablet; Refill: 2    9. Gastroesophageal reflux disease, unspecified whether esophagitis present  - omeprazole (PRILOSEC) 20 MG capsule; Take 1 capsule (20 mg total) by mouth once daily.  Dispense: 90 capsule; Refill: 1    10. Dyslipidemia  - rosuvastatin (CRESTOR) 20 MG tablet; Take 1 tablet (20 mg total) by mouth every evening.  Dispense: 90 tablet; Refill: 1    11. Environmental allergies  - montelukast (SINGULAIR) 10 mg tablet; Take 1 tablet (10 mg total) by mouth every evening.  Dispense: 90 tablet; Refill: 1    12. Thrombus of pulmonary vein    13. Cancer related pain    14. Lumbar radiculopathy          -cont current med regimen other than change Stiolto to Trelegy; pt states he's having more SOB lately    Total time spent face-to-face and non-face-to-face coordinating care for this encounter was: 25 min          Chronic conditions status updated as per HPI.  Other than changes above, cont current medications and maintain follow up with specialists.  Return to clinic in 3 months.          THOMAS Oviedo     [facility]

## 2022-12-12 NOTE — PROGRESS NOTES
Subjective:         Patient ID: Mayur Lundberg is a 61 y.o. male.    Chief Complaint: Low-back Pain        Pain  This is a chronic problem. The current episode started more than 1 year ago. The problem occurs daily. The problem has been unchanged. Associated symptoms include arthralgias and neck pain. Pertinent negatives include no anorexia, change in bowel habit, chest pain, coughing, diaphoresis, fever, sore throat, vertigo or vomiting.   Review of Systems   Constitutional:  Negative for activity change, appetite change, diaphoresis, fever and unexpected weight change.   HENT:  Negative for drooling, ear discharge, ear pain, facial swelling, nosebleeds, sore throat, trouble swallowing, voice change and goiter.    Eyes:  Negative for photophobia, pain, discharge, redness and visual disturbance.   Respiratory:  Negative for apnea, cough, choking, chest tightness, shortness of breath, wheezing and stridor.    Cardiovascular:  Negative for chest pain, palpitations and leg swelling.   Gastrointestinal:  Negative for abdominal distention, anorexia, change in bowel habit, diarrhea, rectal pain, vomiting, fecal incontinence and change in bowel habit.   Endocrine: Negative for cold intolerance, heat intolerance, polydipsia, polyphagia and polyuria.   Genitourinary:  Negative for bladder incontinence, dysuria, flank pain and frequency.   Musculoskeletal:  Positive for arthralgias, back pain, gait problem and neck pain.   Integumentary:  Negative for color change and pallor.   Neurological:  Negative for dizziness, vertigo, seizures, syncope, facial asymmetry, speech difficulty, light-headedness, coordination difficulties, memory loss and coordination difficulties.   Hematological:  Negative for adenopathy. Does not bruise/bleed easily.   Psychiatric/Behavioral:  Negative for agitation, behavioral problems, confusion, decreased concentration, dysphoric mood, hallucinations, self-injury and suicidal ideas. The patient is  "not nervous/anxious and is not hyperactive.          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 Renetta      ESOPHAGOGASTRODUODENOSCOPY      LEFT C3-C7 MBB Left 06/08/2011    DR MCFARLAND    LEFT L4-L5 TFESI Left     X6 [id number]  DR MCFARLAND    LUMBAR EPIDURAL INJECTION      TFESI L4-5      NECK SURGERY      RIGHT C3-C7 MBB Right 2015    X2  DR MCFARLAND    SOFT TISSUE CYST EXCISION       Social History     Socioeconomic History    Marital status:    Occupational History    Occupation: retired   Tobacco Use    Smoking status: Some Days     Packs/day: 1.00     Types: Cigarettes    Smokeless tobacco: Never   Substance and Sexual Activity    Alcohol use: Yes     Alcohol/week: 2.0 standard drinks     Types: 2 Cans of beer per week    Drug use: Never    Sexual activity: Not Currently     Family History   Problem Relation Age of Onset    Diabetes Mother     Cancer Mother     Heart disease Mother     Hypertension Mother     Diabetes Sister     Diabetes Brother     Diabetes Father     Hypertension Father     Stroke Father      Review of patient's allergies indicates:  No Known Allergies     Objective:  Vitals:    12/14/22 0819   BP: 135/86   Pulse: 99   Resp: 18   Weight: 75.8 kg (167 lb)   Height: 6' 2" (1.88 m)   PainSc:   9         Physical Exam  Vitals and nursing note reviewed. Exam conducted with a chaperone present.   Constitutional: "       General: He is awake. He is not in acute distress.     Appearance: Normal appearance. He is not ill-appearing or toxic-appearing.   HENT:      Head: Normocephalic and atraumatic.      Nose: Nose normal.      Mouth/Throat:      Mouth: Mucous membranes are moist.      Pharynx: Oropharynx is clear.   Eyes:      Conjunctiva/sclera: Conjunctivae normal.      Pupils: Pupils are equal, round, and reactive to light.   Cardiovascular:      Rate and Rhythm: Normal rate.   Pulmonary:      Effort: Pulmonary effort is normal. No respiratory distress.   Chest:      Chest wall: Tenderness present.   Abdominal:      Palpations: Abdomen is soft.      Tenderness: There is no guarding.   Musculoskeletal:         General: Normal range of motion.      Cervical back: Normal range of motion and neck supple. Tenderness present. No rigidity.      Thoracic back: Tenderness present.      Lumbar back: Tenderness present.   Skin:     General: Skin is warm and dry.      Coloration: Skin is not jaundiced or pale.   Neurological:      General: No focal deficit present.      Mental Status: He is alert and oriented to person, place, and time. Mental status is at baseline.      Cranial Nerves: No cranial nerve deficit (II-XII).      Gait: Gait abnormal.   Psychiatric:         Mood and Affect: Mood normal.         Behavior: Behavior normal. Behavior is cooperative.         Thought Content: Thought content normal.         MRI Cervical Spine W WO Cont  Narrative: MRI CERVICAL SPINE W/WO CONTRAST    Indication: Extremity weakness, previous surgery     Technique: Axial and sagittal imaging of the spine is performed using  T1, T2 , STIR and T2 fat sat sequences without contrast. Post contrast  the T1-weighted sequences are performed after administration of 20 cc  Dotarem.    Comparison: 28 January 2019    Findings: Vertebral bodies are similar in height and alignment.   Anterior fusion at C4-C6 appear similar. There is been posterior  laminectomy and  posterior fusion performed since previous study, appears  within normal limits.    There is cord signal hyperintensity with mild loss at the C3-4 level  especially on the left side. The signal abnormality was present in the  spinal cord on the previous study at this level.    C3-4: There is disc osteophyte complex with mild canal stenosis. There  is uncovertebral joint hypertrophy with mild bilateral foraminal  stenosis.    C5-C6: There is uncovertebral joint hypertrophy contributing to moderate  right foraminal stenosis.    C6-C7: There is disc osteophyte complex with mild central canal  stenosis. There is uncovertebral joint hypertrophy with moderate to  severe right and moderate left foraminal stenosis.    C7-T1: There is disc osteophyte complex with mild central canal  stenosis. There is uncovertebral joint and facet joint hypertrophy with  severe bilateral foraminal stenosis.    Remaining spinal cord signal appears within normal limits.      Osseous marrow signal appears within normal limits.     No abnormal enhancement is seen on the post-contrasted images when  compared to the precontrast study.  Impression: Impression: Multilevel cervical fusion and spondylitic changes as  described above. 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    This report has been electronically signed by Marlo Chaudhary  X-Ray Tibia Fibula 2 View Left  Narrative: CR SPINE CERVICAL AP AND LATERAL    Indication: Cervicalgia     Comparison: 15 Patsy 2014    Findings: No fracture is seen. Vertebral body heights and alignment are  stable with fusion from C3-C6 unchanged from previous.    There is loss of disc space and degenerative change moderate at C6-C7  and mild to moderate at C2-3.  Impression: Impression: Surgical changes and spondylitic changes. Stable when  compared to previous.    This report has been electronically signed by Marlo Chaudhary         Office Visit on 10/18/2022   Component Date Value Ref Range  Status    POC Amphetamines 10/18/2022 Negative  Negative, Inconclusive Final    POC Barbiturates 10/18/2022 Negative  Negative, Inconclusive Final    POC Benzodiazepines 10/18/2022 Negative  Negative, Inconclusive Final    POC Cocaine 10/18/2022 Negative  Negative, Inconclusive Final    POC THC 10/18/2022 Negative  Negative, Inconclusive Final    POC Methadone 10/18/2022 Negative  Negative, Inconclusive Final    POC Methamphetamine 10/18/2022 Negative  Negative, Inconclusive Final    POC Opiates 10/18/2022 Presumptive Positive (A)  Negative, Inconclusive Final    POC Oxycodone 10/18/2022 Negative  Negative, Inconclusive Final    POC Phencyclidine 10/18/2022 Negative  Negative, Inconclusive Final    POC Methylenedioxymethamphetamine * 10/18/2022 Negative  Negative, Inconclusive Final    POC Tricyclic Antidepressants 10/18/2022 Negative  Negative, Inconclusive Final    POC Buprenorphine 10/18/2022 Negative   Final     Acceptable 10/18/2022 Yes   Final    POC Temperature (Urine) 10/18/2022 94   Final         No orders of the defined types were placed in this encounter.      Requested Prescriptions     Signed Prescriptions Disp Refills    naloxone (NARCAN) 4 mg/actuation Spry 1 each 0     Si spray (4 mg total) by Nasal route once. for 1 dose    HYDROcodone-acetaminophen (NORCO)  mg per tablet 90 tablet 0     Sig: Take 1 tablet by mouth every 8 (eight) hours as needed for Pain.    HYDROcodone-acetaminophen (NORCO)  mg per tablet 90 tablet 0     Sig: Take 1 tablet by mouth every 8 (eight) hours as needed for Pain.    HYDROcodone-acetaminophen (NORCO)  mg per tablet 90 tablet 0     Sig: Take 1 tablet by mouth every 8 (eight) hours as needed for Pain.       Assessment:     1. Malignant neoplasm of lower lobe, right bronchus or lung    2. Cancer related pain    3. Lumbar radiculopathy    4. Chronic pain of both knees           A's of Opioid Risk Assessment  Activity:Patient can perform  ADL.   Analgesia:Patients pain is partially controlled by current medication. Patient has tried OTC medications such as Tylenol and Ibuprofen with out relief.   Adverse Effects: Patient denies constipation or sedation.  Aberrant Behavior:  reviewed with no aberrant drug seeking/taking behavior.  Overdose reversal drug naloxone discussed    Drug screen reviewed      Plan:    Narcan December 2022    Motorized scooter for mobility history stroke left-sided weakness    Rheumatoid arthritis    Lung cancer, follows oncology [facility], he now has further metastasis lung cancer left ribcage     Complaint increasing coughing, increasing chest wall pain left greater than right     He states he will call his oncologist and informed him of the increasing pain and coughing     He occasionally uses prescription cough syrup which has been authorized from our clinic    He would like to continue conservative management    Not a procedure candidate    Continue activity as tolerated    Follow-up 3 months    Dr. Masterson, February 2023    Bring original prescription medication bottles/container/box with labels to each visit    Pill count    Physical therapy    Massage therapy declines

## 2022-12-14 ENCOUNTER — OFFICE VISIT (OUTPATIENT)
Dept: PAIN MEDICINE | Facility: CLINIC | Age: 62
End: 2022-12-14
Payer: COMMERCIAL

## 2022-12-14 VITALS
HEIGHT: 74 IN | DIASTOLIC BLOOD PRESSURE: 86 MMHG | HEART RATE: 99 BPM | BODY MASS INDEX: 21.43 KG/M2 | WEIGHT: 167 LBS | SYSTOLIC BLOOD PRESSURE: 135 MMHG | RESPIRATION RATE: 18 BRPM

## 2022-12-14 DIAGNOSIS — G89.29 CHRONIC PAIN OF BOTH KNEES: Chronic | ICD-10-CM

## 2022-12-14 DIAGNOSIS — G89.3 CANCER RELATED PAIN: Chronic | ICD-10-CM

## 2022-12-14 DIAGNOSIS — M54.16 LUMBAR RADICULOPATHY: Chronic | ICD-10-CM

## 2022-12-14 DIAGNOSIS — M25.561 CHRONIC PAIN OF BOTH KNEES: Chronic | ICD-10-CM

## 2022-12-14 DIAGNOSIS — C34.31 MALIGNANT NEOPLASM OF LOWER LOBE, RIGHT BRONCHUS OR LUNG: Primary | Chronic | ICD-10-CM

## 2022-12-14 DIAGNOSIS — M25.562 CHRONIC PAIN OF BOTH KNEES: Chronic | ICD-10-CM

## 2022-12-14 PROCEDURE — 99215 OFFICE O/P EST HI 40 MIN: CPT | Mod: PBBFAC | Performed by: PHYSICIAN ASSISTANT

## 2022-12-14 PROCEDURE — 99214 OFFICE O/P EST MOD 30 MIN: CPT | Mod: S$PBB,,, | Performed by: PHYSICIAN ASSISTANT

## 2022-12-14 PROCEDURE — 3075F PR MOST RECENT SYSTOLIC BLOOD PRESS GE 130-139MM HG: ICD-10-PCS | Mod: CPTII,,, | Performed by: PHYSICIAN ASSISTANT

## 2022-12-14 PROCEDURE — 99214 PR OFFICE/OUTPT VISIT, EST, LEVL IV, 30-39 MIN: ICD-10-PCS | Mod: S$PBB,,, | Performed by: PHYSICIAN ASSISTANT

## 2022-12-14 PROCEDURE — 3079F DIAST BP 80-89 MM HG: CPT | Mod: CPTII,,, | Performed by: PHYSICIAN ASSISTANT

## 2022-12-14 PROCEDURE — 3075F SYST BP GE 130 - 139MM HG: CPT | Mod: CPTII,,, | Performed by: PHYSICIAN ASSISTANT

## 2022-12-14 PROCEDURE — 1159F PR MEDICATION LIST DOCUMENTED IN MEDICAL RECORD: ICD-10-PCS | Mod: CPTII,,, | Performed by: PHYSICIAN ASSISTANT

## 2022-12-14 PROCEDURE — 3079F PR MOST RECENT DIASTOLIC BLOOD PRESSURE 80-89 MM HG: ICD-10-PCS | Mod: CPTII,,, | Performed by: PHYSICIAN ASSISTANT

## 2022-12-14 PROCEDURE — 3008F PR BODY MASS INDEX (BMI) DOCUMENTED: ICD-10-PCS | Mod: CPTII,,, | Performed by: PHYSICIAN ASSISTANT

## 2022-12-14 PROCEDURE — 1159F MED LIST DOCD IN RCRD: CPT | Mod: CPTII,,, | Performed by: PHYSICIAN ASSISTANT

## 2022-12-14 PROCEDURE — 3008F BODY MASS INDEX DOCD: CPT | Mod: CPTII,,, | Performed by: PHYSICIAN ASSISTANT

## 2022-12-14 RX ORDER — NALOXONE HYDROCHLORIDE 4 MG/.1ML
1 SPRAY NASAL ONCE
Qty: 1 EACH | Refills: 0 | Status: SHIPPED | OUTPATIENT
Start: 2022-12-14 | End: 2022-12-14

## 2022-12-14 RX ORDER — HYDROCODONE BITARTRATE AND ACETAMINOPHEN 10; 325 MG/1; MG/1
1 TABLET ORAL EVERY 8 HOURS PRN
Qty: 90 TABLET | Refills: 0 | Status: SHIPPED | OUTPATIENT
Start: 2023-02-25 | End: 2023-03-23 | Stop reason: SDUPTHER

## 2022-12-14 RX ORDER — HYDROCODONE BITARTRATE AND ACETAMINOPHEN 10; 325 MG/1; MG/1
1 TABLET ORAL EVERY 8 HOURS PRN
Qty: 90 TABLET | Refills: 0 | Status: SHIPPED | OUTPATIENT
Start: 2023-01-27 | End: 2023-03-23 | Stop reason: SDUPTHER

## 2022-12-14 RX ORDER — HYDROCODONE BITARTRATE AND ACETAMINOPHEN 10; 325 MG/1; MG/1
1 TABLET ORAL EVERY 8 HOURS PRN
Qty: 90 TABLET | Refills: 0 | Status: SHIPPED | OUTPATIENT
Start: 2022-12-28 | End: 2023-01-27

## 2023-03-23 NOTE — PROGRESS NOTES
Subjective:         Patient ID: Mayur Lundberg is a 62 y.o. male.    Chief Complaint: Low-back Pain        Pain  This is a chronic problem. The current episode started more than 1 year ago. The problem occurs daily. The problem has been waxing and waning. Associated symptoms include arthralgias and neck pain. Pertinent negatives include no anorexia, change in bowel habit, chest pain, chills, coughing, diaphoresis, fatigue, fever, sore throat, vertigo or vomiting.   Review of Systems   Constitutional:  Negative for activity change, appetite change, chills, diaphoresis, fatigue, fever and unexpected weight change.   HENT:  Negative for drooling, ear discharge, ear pain, facial swelling, nosebleeds, sore throat, trouble swallowing, voice change and goiter.    Eyes:  Negative for photophobia, pain, discharge, redness and visual disturbance.   Respiratory:  Negative for apnea, cough, choking, chest tightness, shortness of breath, wheezing and stridor.    Cardiovascular:  Negative for chest pain, palpitations and leg swelling.   Gastrointestinal:  Negative for abdominal distention, anorexia, change in bowel habit, diarrhea, rectal pain, vomiting, fecal incontinence and change in bowel habit.   Endocrine: Negative for cold intolerance, heat intolerance, polydipsia, polyphagia and polyuria.   Genitourinary:  Negative for bladder incontinence, dysuria, flank pain and frequency.   Musculoskeletal:  Positive for arthralgias, back pain, gait problem and neck pain.   Integumentary:  Negative for color change and pallor.   Neurological:  Negative for dizziness, vertigo, seizures, syncope, facial asymmetry, speech difficulty, light-headedness, coordination difficulties, memory loss and coordination difficulties.   Hematological:  Negative for adenopathy. Does not bruise/bleed easily.   Psychiatric/Behavioral:  Negative for agitation, behavioral problems, confusion, decreased concentration, dysphoric mood, hallucinations,  "self-injury and suicidal ideas. The patient is not nervous/anxious and is not hyperactive.          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 Renetta      ESOPHAGOGASTRODUODENOSCOPY      LEFT C3-C7 MBB Left 06/08/2011    DR MCFARLAND    LEFT L4-L5 TFESI Left     X6 [id number]  DR MCFARLAND    LUMBAR EPIDURAL INJECTION      TFESI L4-5      NECK SURGERY      RIGHT C3-C7 MBB Right 2015    X2  DR MCFARLAND    SOFT TISSUE CYST EXCISION       Social History     Socioeconomic History    Marital status:    Occupational History    Occupation: retired   Tobacco Use    Smoking status: Some Days     Packs/day: 1.00     Types: Cigarettes    Smokeless tobacco: Never   Substance and Sexual Activity    Alcohol use: Yes     Alcohol/week: 2.0 standard drinks     Types: 2 Cans of beer per week    Drug use: Never    Sexual activity: Not Currently     Family History   Problem Relation Age of Onset    Diabetes Mother     Cancer Mother     Heart disease Mother     Hypertension Mother     Diabetes Sister     Diabetes Brother     Diabetes Father     Hypertension Father     Stroke Father      Review of patient's allergies indicates:  No Known Allergies     Objective:  Vitals:    03/27/23 0838   BP: (!) 110/57   Pulse: (!) 115   Resp: (!) 22   Weight: 75.8 kg (167 lb)   Height: 6' 2" (1.88 m)   PainSc:   7         Physical Exam  Vitals and nursing note " reviewed. Exam conducted with a chaperone present.   Constitutional:       General: He is awake. He is not in acute distress.     Appearance: Normal appearance. He is not ill-appearing, toxic-appearing or diaphoretic.   HENT:      Head: Normocephalic and atraumatic.      Nose: Nose normal.      Mouth/Throat:      Mouth: Mucous membranes are moist.      Pharynx: Oropharynx is clear.   Eyes:      Conjunctiva/sclera: Conjunctivae normal.      Pupils: Pupils are equal, round, and reactive to light.   Cardiovascular:      Rate and Rhythm: Normal rate.   Pulmonary:      Effort: Pulmonary effort is normal. No respiratory distress.   Chest:      Chest wall: Tenderness present.   Abdominal:      Palpations: Abdomen is soft.      Tenderness: There is no guarding.   Musculoskeletal:         General: Normal range of motion.      Cervical back: Normal range of motion and neck supple. Tenderness present. No rigidity.      Thoracic back: Tenderness present.      Lumbar back: Tenderness present.   Skin:     General: Skin is warm and dry.      Coloration: Skin is not jaundiced or pale.   Neurological:      General: No focal deficit present.      Mental Status: He is alert and oriented to person, place, and time. Mental status is at baseline.      Cranial Nerves: No cranial nerve deficit (II-XII).      Gait: Gait abnormal.   Psychiatric:         Mood and Affect: Mood normal.         Behavior: Behavior normal. Behavior is cooperative.         Thought Content: Thought content normal.         MRI Cervical Spine W WO Cont  Narrative: MRI CERVICAL SPINE W/WO CONTRAST    Indication: Extremity weakness, previous surgery     Technique: Axial and sagittal imaging of the spine is performed using  T1, T2 , STIR and T2 fat sat sequences without contrast. Post contrast  the T1-weighted sequences are performed after administration of 20 cc  Dotarem.    Comparison: 28 January 2019    Findings: Vertebral bodies are similar in height and alignment.    Anterior fusion at C4-C6 appear similar. There is been posterior  laminectomy and posterior fusion performed since previous study, appears  within normal limits.    There is cord signal hyperintensity with mild loss at the C3-4 level  especially on the left side. The signal abnormality was present in the  spinal cord on the previous study at this level.    C3-4: There is disc osteophyte complex with mild canal stenosis. There  is uncovertebral joint hypertrophy with mild bilateral foraminal  stenosis.    C5-C6: There is uncovertebral joint hypertrophy contributing to moderate  right foraminal stenosis.    C6-C7: There is disc osteophyte complex with mild central canal  stenosis. There is uncovertebral joint hypertrophy with moderate to  severe right and moderate left foraminal stenosis.    C7-T1: There is disc osteophyte complex with mild central canal  stenosis. There is uncovertebral joint and facet joint hypertrophy with  severe bilateral foraminal stenosis.    Remaining spinal cord signal appears within normal limits.      Osseous marrow signal appears within normal limits.     No abnormal enhancement is seen on the post-contrasted images when  compared to the precontrast study.  Impression: Impression: Multilevel cervical fusion and spondylitic changes as  described above. 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    This report has been electronically signed by Marlo Chaudhary  X-Ray Tibia Fibula 2 View Left  Narrative: CR SPINE CERVICAL AP AND LATERAL    Indication: Cervicalgia     Comparison: 15 Patsy 2014    Findings: No fracture is seen. Vertebral body heights and alignment are  stable with fusion from C3-C6 unchanged from previous.    There is loss of disc space and degenerative change moderate at C6-C7  and mild to moderate at C2-3.  Impression: Impression: Surgical changes and spondylitic changes. Stable when  compared to previous.    This report has been electronically signed by  Marlo Chaudhary         Office Visit on 10/18/2022   Component Date Value Ref Range Status    POC Amphetamines 10/18/2022 Negative  Negative, Inconclusive Final    POC Barbiturates 10/18/2022 Negative  Negative, Inconclusive Final    POC Benzodiazepines 10/18/2022 Negative  Negative, Inconclusive Final    POC Cocaine 10/18/2022 Negative  Negative, Inconclusive Final    POC THC 10/18/2022 Negative  Negative, Inconclusive Final    POC Methadone 10/18/2022 Negative  Negative, Inconclusive Final    POC Methamphetamine 10/18/2022 Negative  Negative, Inconclusive Final    POC Opiates 10/18/2022 Presumptive Positive (A)  Negative, Inconclusive Final    POC Oxycodone 10/18/2022 Negative  Negative, Inconclusive Final    POC Phencyclidine 10/18/2022 Negative  Negative, Inconclusive Final    POC Methylenedioxymethamphetamine * 10/18/2022 Negative  Negative, Inconclusive Final    POC Tricyclic Antidepressants 10/18/2022 Negative  Negative, Inconclusive Final    POC Buprenorphine 10/18/2022 Negative   Final     Acceptable 10/18/2022 Yes   Final    POC Temperature (Urine) 10/18/2022 94   Final         No orders of the defined types were placed in this encounter.      Requested Prescriptions     Signed Prescriptions Disp Refills    HYDROcodone-acetaminophen (NORCO)  mg per tablet 90 tablet 0     Sig: Take 1 tablet by mouth every 8 (eight) hours as needed for Pain.    HYDROcodone-acetaminophen (NORCO)  mg per tablet 90 tablet 0     Sig: Take 1 tablet by mouth every 8 (eight) hours as needed for Pain.    HYDROcodone-acetaminophen (NORCO)  mg per tablet 90 tablet 0     Sig: Take 1 tablet by mouth every 8 (eight) hours as needed for Pain.       Assessment:     1. Malignant neoplasm of lower lobe, right bronchus or lung    2. Cancer related pain    3. Lumbar radiculopathy    4. Chronic pain of both knees           A's of Opioid Risk Assessment  Activity:Patient can perform ADL.   Analgesia:Patients pain  is partially controlled by current medication. Patient has tried OTC medications such as Tylenol and Ibuprofen with out relief.   Adverse Effects: Patient denies constipation or sedation.  Aberrant Behavior:  reviewed with no aberrant drug seeking/taking behavior.  Overdose reversal drug naloxone discussed    Drug screen reviewed      Plan:    Narcan December 2022    Motorized scooter for mobility history stroke left-sided weakness    Rheumatoid arthritis    Lung cancer, follows oncology [facility], metastasis lung cancer left ribcage     Complaint increasing coughing, increasing chest wall pain left greater than right     Complaint increasing fatigue and weakness    Continue nasal cannula O2    He occasionally uses prescription cough syrup which has been authorized from our clinic    He would like to continue conservative management    Not a procedure candidate    Continue activity as tolerated    Follow-up 3 months    Dr. Masterson, February 2023    Bring original prescription medication bottles/container/box with labels to each visit    Pill count    Physical therapy    Massage therapy declines

## 2023-03-27 ENCOUNTER — OFFICE VISIT (OUTPATIENT)
Dept: PAIN MEDICINE | Facility: CLINIC | Age: 63
End: 2023-03-27
Payer: COMMERCIAL

## 2023-03-27 VITALS
SYSTOLIC BLOOD PRESSURE: 110 MMHG | BODY MASS INDEX: 21.43 KG/M2 | DIASTOLIC BLOOD PRESSURE: 57 MMHG | RESPIRATION RATE: 22 BRPM | HEART RATE: 115 BPM | WEIGHT: 167 LBS | HEIGHT: 74 IN

## 2023-03-27 DIAGNOSIS — G89.3 CANCER RELATED PAIN: Chronic | ICD-10-CM

## 2023-03-27 DIAGNOSIS — G89.29 CHRONIC PAIN OF BOTH KNEES: Chronic | ICD-10-CM

## 2023-03-27 DIAGNOSIS — M25.561 CHRONIC PAIN OF BOTH KNEES: Chronic | ICD-10-CM

## 2023-03-27 DIAGNOSIS — M25.562 CHRONIC PAIN OF BOTH KNEES: Chronic | ICD-10-CM

## 2023-03-27 DIAGNOSIS — M54.16 LUMBAR RADICULOPATHY: Chronic | ICD-10-CM

## 2023-03-27 DIAGNOSIS — C34.31 MALIGNANT NEOPLASM OF LOWER LOBE, RIGHT BRONCHUS OR LUNG: Primary | Chronic | ICD-10-CM

## 2023-03-27 PROCEDURE — 99214 PR OFFICE/OUTPT VISIT, EST, LEVL IV, 30-39 MIN: ICD-10-PCS | Mod: S$PBB,,, | Performed by: PHYSICIAN ASSISTANT

## 2023-03-27 PROCEDURE — 3008F BODY MASS INDEX DOCD: CPT | Mod: CPTII,,, | Performed by: PHYSICIAN ASSISTANT

## 2023-03-27 PROCEDURE — 1159F MED LIST DOCD IN RCRD: CPT | Mod: CPTII,,, | Performed by: PHYSICIAN ASSISTANT

## 2023-03-27 PROCEDURE — 3078F PR MOST RECENT DIASTOLIC BLOOD PRESSURE < 80 MM HG: ICD-10-PCS | Mod: CPTII,,, | Performed by: PHYSICIAN ASSISTANT

## 2023-03-27 PROCEDURE — 1159F PR MEDICATION LIST DOCUMENTED IN MEDICAL RECORD: ICD-10-PCS | Mod: CPTII,,, | Performed by: PHYSICIAN ASSISTANT

## 2023-03-27 PROCEDURE — 3074F PR MOST RECENT SYSTOLIC BLOOD PRESSURE < 130 MM HG: ICD-10-PCS | Mod: CPTII,,, | Performed by: PHYSICIAN ASSISTANT

## 2023-03-27 PROCEDURE — 3078F DIAST BP <80 MM HG: CPT | Mod: CPTII,,, | Performed by: PHYSICIAN ASSISTANT

## 2023-03-27 PROCEDURE — 3074F SYST BP LT 130 MM HG: CPT | Mod: CPTII,,, | Performed by: PHYSICIAN ASSISTANT

## 2023-03-27 PROCEDURE — 99214 OFFICE O/P EST MOD 30 MIN: CPT | Mod: S$PBB,,, | Performed by: PHYSICIAN ASSISTANT

## 2023-03-27 PROCEDURE — 99215 OFFICE O/P EST HI 40 MIN: CPT | Mod: PBBFAC | Performed by: PHYSICIAN ASSISTANT

## 2023-03-27 PROCEDURE — 3008F PR BODY MASS INDEX (BMI) DOCUMENTED: ICD-10-PCS | Mod: CPTII,,, | Performed by: PHYSICIAN ASSISTANT

## 2023-03-27 RX ORDER — HYDROCODONE BITARTRATE AND ACETAMINOPHEN 10; 325 MG/1; MG/1
1 TABLET ORAL EVERY 8 HOURS PRN
Qty: 90 TABLET | Refills: 0 | Status: SHIPPED | OUTPATIENT
Start: 2023-03-27 | End: 2023-04-26

## 2023-03-27 RX ORDER — HYDROCODONE BITARTRATE AND ACETAMINOPHEN 10; 325 MG/1; MG/1
1 TABLET ORAL EVERY 8 HOURS PRN
Qty: 90 TABLET | Refills: 0 | Status: SHIPPED | OUTPATIENT
Start: 2023-04-26 | End: 2023-05-26

## 2023-03-27 RX ORDER — HYDROCODONE BITARTRATE AND ACETAMINOPHEN 10; 325 MG/1; MG/1
1 TABLET ORAL EVERY 8 HOURS PRN
Qty: 90 TABLET | Refills: 0 | Status: SHIPPED | OUTPATIENT
Start: 2023-05-26 | End: 2023-06-25